# Patient Record
Sex: MALE | Race: BLACK OR AFRICAN AMERICAN | NOT HISPANIC OR LATINO | Employment: FULL TIME | URBAN - METROPOLITAN AREA
[De-identification: names, ages, dates, MRNs, and addresses within clinical notes are randomized per-mention and may not be internally consistent; named-entity substitution may affect disease eponyms.]

---

## 2017-10-03 ENCOUNTER — TRANSCRIBE ORDERS (OUTPATIENT)
Dept: ADMINISTRATIVE | Facility: HOSPITAL | Age: 52
End: 2017-10-03

## 2017-10-03 ENCOUNTER — HOSPITAL ENCOUNTER (OUTPATIENT)
Dept: CARDIOLOGY | Facility: HOSPITAL | Age: 52
Discharge: HOME | End: 2017-10-03
Attending: ORTHOPAEDIC SURGERY | Admitting: ORTHOPAEDIC SURGERY

## 2017-10-03 ENCOUNTER — LAB (OUTPATIENT)
Dept: LAB | Facility: HOSPITAL | Age: 52
End: 2017-10-03
Attending: ORTHOPAEDIC SURGERY

## 2017-10-03 DIAGNOSIS — Z01.811 PRE-OP CHEST EXAM: ICD-10-CM

## 2017-10-03 DIAGNOSIS — Z01.818 PRE-OP EXAM: Primary | ICD-10-CM

## 2017-10-03 DIAGNOSIS — Z01.811 PRE-OP CHEST EXAM: Primary | ICD-10-CM

## 2017-10-03 DIAGNOSIS — Z01.818 PRE-OP EXAM: ICD-10-CM

## 2017-10-03 LAB
ALBUMIN SERPL-MCNC: 4.4 G/DL (ref 3.5–5.2)
ALBUMIN/GLOB SERPL: 1.3 G/DL
ALP SERPL-CCNC: 79 U/L (ref 39–117)
ALT SERPL W P-5'-P-CCNC: 18 U/L (ref 1–41)
ANION GAP SERPL CALCULATED.3IONS-SCNC: 12.6 MMOL/L
AST SERPL-CCNC: 18 U/L (ref 1–40)
BASOPHILS # BLD AUTO: 0.02 10*3/MM3 (ref 0–0.2)
BASOPHILS NFR BLD AUTO: 0.3 % (ref 0–1.5)
BILIRUB SERPL-MCNC: 0.4 MG/DL (ref 0.1–1.2)
BUN BLD-MCNC: 15 MG/DL (ref 6–20)
BUN/CREAT SERPL: 14.7 (ref 7–25)
CALCIUM SPEC-SCNC: 9.6 MG/DL (ref 8.6–10.5)
CHLORIDE SERPL-SCNC: 102 MMOL/L (ref 98–107)
CO2 SERPL-SCNC: 25.4 MMOL/L (ref 22–29)
CREAT BLD-MCNC: 1.02 MG/DL (ref 0.76–1.27)
DEPRECATED RDW RBC AUTO: 44.2 FL (ref 37–54)
EOSINOPHIL # BLD AUTO: 0.27 10*3/MM3 (ref 0–0.7)
EOSINOPHIL NFR BLD AUTO: 3.8 % (ref 0.3–6.2)
ERYTHROCYTE [DISTWIDTH] IN BLOOD BY AUTOMATED COUNT: 12.9 % (ref 11.5–14.5)
GFR SERPL CREATININE-BSD FRML MDRD: 93 ML/MIN/1.73
GLOBULIN UR ELPH-MCNC: 3.3 GM/DL
GLUCOSE BLD-MCNC: 84 MG/DL (ref 65–99)
HCT VFR BLD AUTO: 40.2 % (ref 40.4–52.2)
HGB BLD-MCNC: 13.2 G/DL (ref 13.7–17.6)
IMM GRANULOCYTES # BLD: 0.02 10*3/MM3 (ref 0–0.03)
IMM GRANULOCYTES NFR BLD: 0.3 % (ref 0–0.5)
LYMPHOCYTES # BLD AUTO: 1.84 10*3/MM3 (ref 0.9–4.8)
LYMPHOCYTES NFR BLD AUTO: 26.1 % (ref 19.6–45.3)
MCH RBC QN AUTO: 30.6 PG (ref 27–32.7)
MCHC RBC AUTO-ENTMCNC: 32.8 G/DL (ref 32.6–36.4)
MCV RBC AUTO: 93.3 FL (ref 79.8–96.2)
MONOCYTES # BLD AUTO: 0.57 10*3/MM3 (ref 0.2–1.2)
MONOCYTES NFR BLD AUTO: 8.1 % (ref 5–12)
NEUTROPHILS # BLD AUTO: 4.34 10*3/MM3 (ref 1.9–8.1)
NEUTROPHILS NFR BLD AUTO: 61.4 % (ref 42.7–76)
PLATELET # BLD AUTO: 223 10*3/MM3 (ref 140–500)
PMV BLD AUTO: 11.2 FL (ref 6–12)
POTASSIUM BLD-SCNC: 4.3 MMOL/L (ref 3.5–5.2)
PROT SERPL-MCNC: 7.7 G/DL (ref 6–8.5)
RBC # BLD AUTO: 4.31 10*6/MM3 (ref 4.6–6)
SODIUM BLD-SCNC: 140 MMOL/L (ref 136–145)
WBC NRBC COR # BLD: 7.06 10*3/MM3 (ref 4.5–10.7)

## 2017-10-03 PROCEDURE — 80053 COMPREHEN METABOLIC PANEL: CPT

## 2017-10-03 PROCEDURE — 93005 ELECTROCARDIOGRAM TRACING: CPT | Performed by: ORTHOPAEDIC SURGERY

## 2017-10-03 PROCEDURE — 36415 COLL VENOUS BLD VENIPUNCTURE: CPT

## 2017-10-03 PROCEDURE — 85025 COMPLETE CBC W/AUTO DIFF WBC: CPT

## 2017-10-03 PROCEDURE — 93010 ELECTROCARDIOGRAM REPORT: CPT | Performed by: INTERNAL MEDICINE

## 2019-05-24 ENCOUNTER — HOSPITAL ENCOUNTER (OUTPATIENT)
Dept: ORTHOPEDIC SURGERY | Facility: CLINIC | Age: 54
Setting detail: RECURRING SERIES
Discharge: HOME | End: 2019-08-20
Attending: ORTHOPAEDIC SURGERY

## 2019-07-20 ENCOUNTER — HOSPITAL ENCOUNTER (OUTPATIENT)
Dept: URGENT CARE | Facility: CLINIC | Age: 54
Discharge: HOME | End: 2019-07-20

## 2019-11-06 ENCOUNTER — HOSPITAL ENCOUNTER (OUTPATIENT)
Dept: ORTHOPEDIC SURGERY | Facility: CLINIC | Age: 54
Setting detail: RECURRING SERIES
Discharge: HOME | End: 2019-12-04
Attending: ORTHOPAEDIC SURGERY

## 2021-02-08 ENCOUNTER — HOSPITAL ENCOUNTER (OUTPATIENT)
Dept: URGENT CARE | Facility: CLINIC | Age: 56
Discharge: HOME | End: 2021-02-08
Attending: NURSE PRACTITIONER

## 2021-03-01 ENCOUNTER — HOSPITAL ENCOUNTER (OUTPATIENT)
Dept: PREADMISSION TESTING | Facility: HOSPITAL | Age: 56
Discharge: HOME | End: 2021-03-01
Attending: SPECIALIST

## 2021-03-01 ENCOUNTER — HOSPITAL ENCOUNTER (OUTPATIENT)
Dept: OTHER | Facility: HOSPITAL | Age: 56
Discharge: HOME | End: 2021-03-01
Attending: SPECIALIST

## 2021-03-01 LAB
ANION GAP SERPL CALC-SCNC: 12 MMOL/L (ref 8–19)
BASOPHILS # BLD AUTO: 0.03 10*3/UL (ref 0–0.2)
BASOPHILS NFR BLD AUTO: 0.5 % (ref 0–3)
BUN SERPL-MCNC: 14 MG/DL (ref 5–25)
BUN/CREAT SERPL: 15 {RATIO} (ref 6–20)
CALCIUM SERPL-MCNC: 9 MG/DL (ref 8.7–10.4)
CHLORIDE SERPL-SCNC: 104 MMOL/L (ref 99–111)
CONV ABS IMM GRAN: 0.03 10*3/UL (ref 0–0.2)
CONV CO2: 25 MMOL/L (ref 22–32)
CONV IMMATURE GRAN: 0.5 % (ref 0–1.8)
CREAT UR-MCNC: 0.94 MG/DL (ref 0.7–1.2)
DEPRECATED RDW RBC AUTO: 49.6 FL (ref 35.1–43.9)
EOSINOPHIL # BLD AUTO: 0.18 10*3/UL (ref 0–0.7)
EOSINOPHIL # BLD AUTO: 2.8 % (ref 0–7)
ERYTHROCYTE [DISTWIDTH] IN BLOOD BY AUTOMATED COUNT: 15.2 % (ref 11.6–14.4)
GFR SERPLBLD BASED ON 1.73 SQ M-ARVRAT: >60 ML/MIN/{1.73_M2}
GLUCOSE SERPL-MCNC: 86 MG/DL (ref 70–99)
HCT VFR BLD AUTO: 40.8 % (ref 42–52)
HGB BLD-MCNC: 12.4 G/DL (ref 14–18)
LYMPHOCYTES # BLD AUTO: 1.18 10*3/UL (ref 1–5)
LYMPHOCYTES NFR BLD AUTO: 18.6 % (ref 20–45)
MCH RBC QN AUTO: 26.9 PG (ref 27–31)
MCHC RBC AUTO-ENTMCNC: 30.4 G/DL (ref 33–37)
MCV RBC AUTO: 88.5 FL (ref 80–96)
MONOCYTES # BLD AUTO: 0.6 10*3/UL (ref 0.2–1.2)
MONOCYTES NFR BLD AUTO: 9.4 % (ref 3–10)
NEUTROPHILS # BLD AUTO: 4.33 10*3/UL (ref 2–8)
NEUTROPHILS NFR BLD AUTO: 68.2 % (ref 30–85)
NRBC CBCN: 0 % (ref 0–0.7)
OSMOLALITY SERPL CALC.SUM OF ELEC: 284 MOSM/KG (ref 273–304)
PLATELET # BLD AUTO: 225 10*3/UL (ref 130–400)
PMV BLD AUTO: 11.1 FL (ref 9.4–12.4)
POTASSIUM SERPL-SCNC: 4.3 MMOL/L (ref 3.5–5.3)
RBC # BLD AUTO: 4.61 10*6/UL (ref 4.7–6.1)
SODIUM SERPL-SCNC: 137 MMOL/L (ref 135–147)
WBC # BLD AUTO: 6.35 10*3/UL (ref 4.8–10.8)

## 2021-03-02 LAB — SARS-COV-2 RNA SPEC QL NAA+PROBE: NOT DETECTED

## 2021-03-04 ENCOUNTER — OFFICE VISIT CONVERTED (OUTPATIENT)
Dept: SURGERY | Facility: CLINIC | Age: 56
End: 2021-03-04
Attending: SURGERY

## 2021-03-05 ENCOUNTER — HOSPITAL ENCOUNTER (OUTPATIENT)
Dept: CARDIOLOGY | Facility: HOSPITAL | Age: 56
Discharge: HOME | End: 2021-03-06
Attending: SPECIALIST

## 2021-03-05 LAB
ALBUMIN SERPL-MCNC: 4 G/DL (ref 3.5–5)
ALBUMIN/GLOB SERPL: 1.6 {RATIO} (ref 1.4–2.6)
ALP SERPL-CCNC: 92 U/L (ref 56–119)
ALT SERPL-CCNC: 20 U/L (ref 10–40)
ANION GAP SERPL CALC-SCNC: 9 MMOL/L (ref 8–19)
AST SERPL-CCNC: 16 U/L (ref 15–50)
BASOPHILS # BLD AUTO: 0.04 10*3/UL (ref 0–0.2)
BASOPHILS NFR BLD AUTO: 0.6 % (ref 0–3)
BILIRUB SERPL-MCNC: 0.32 MG/DL (ref 0.2–1.3)
BUN SERPL-MCNC: 15 MG/DL (ref 5–25)
BUN/CREAT SERPL: 15 {RATIO} (ref 6–20)
CALCIUM SERPL-MCNC: 8.8 MG/DL (ref 8.7–10.4)
CHLORIDE SERPL-SCNC: 106 MMOL/L (ref 99–111)
CONV ABS IMM GRAN: 0.01 10*3/UL (ref 0–0.2)
CONV CO2: 26 MMOL/L (ref 22–32)
CONV IMMATURE GRAN: 0.2 % (ref 0–1.8)
CONV TOTAL PROTEIN: 6.5 G/DL (ref 6.3–8.2)
CREAT UR-MCNC: 1.01 MG/DL (ref 0.7–1.2)
DEPRECATED RDW RBC AUTO: 48.3 FL (ref 35.1–43.9)
EOSINOPHIL # BLD AUTO: 0.21 10*3/UL (ref 0–0.7)
EOSINOPHIL # BLD AUTO: 3.2 % (ref 0–7)
ERYTHROCYTE [DISTWIDTH] IN BLOOD BY AUTOMATED COUNT: 15.3 % (ref 11.6–14.4)
GFR SERPLBLD BASED ON 1.73 SQ M-ARVRAT: >60 ML/MIN/{1.73_M2}
GLOBULIN UR ELPH-MCNC: 2.5 G/DL (ref 2–3.5)
GLUCOSE SERPL-MCNC: 96 MG/DL (ref 70–99)
HCT VFR BLD AUTO: 38.1 % (ref 42–52)
HGB BLD-MCNC: 11.7 G/DL (ref 14–18)
LYMPHOCYTES # BLD AUTO: 1.19 10*3/UL (ref 1–5)
LYMPHOCYTES NFR BLD AUTO: 18.2 % (ref 20–45)
MCH RBC QN AUTO: 26.5 PG (ref 27–31)
MCHC RBC AUTO-ENTMCNC: 30.7 G/DL (ref 33–37)
MCV RBC AUTO: 86.4 FL (ref 80–96)
MONOCYTES # BLD AUTO: 0.6 10*3/UL (ref 0.2–1.2)
MONOCYTES NFR BLD AUTO: 9.2 % (ref 3–10)
NEUTROPHILS # BLD AUTO: 4.48 10*3/UL (ref 2–8)
NEUTROPHILS NFR BLD AUTO: 68.6 % (ref 30–85)
NRBC CBCN: 0 % (ref 0–0.7)
OSMOLALITY SERPL CALC.SUM OF ELEC: 285 MOSM/KG (ref 273–304)
PLATELET # BLD AUTO: 196 10*3/UL (ref 130–400)
PMV BLD AUTO: 11.4 FL (ref 9.4–12.4)
POTASSIUM SERPL-SCNC: 4.4 MMOL/L (ref 3.5–5.3)
RBC # BLD AUTO: 4.41 10*6/UL (ref 4.7–6.1)
SODIUM SERPL-SCNC: 137 MMOL/L (ref 135–147)
WBC # BLD AUTO: 6.53 10*3/UL (ref 4.8–10.8)

## 2021-03-21 ENCOUNTER — HOSPITAL ENCOUNTER (OUTPATIENT)
Dept: URGENT CARE | Facility: CLINIC | Age: 56
Discharge: HOME | End: 2021-03-21
Attending: FAMILY MEDICINE

## 2021-05-10 NOTE — H&P
History and Physical      Patient Name: Abelino Canales   Patient ID: [id number]   Sex: Male   YOB: 1965    Referring Provider: Rudolph Allison MD    Visit Date: March 4, 2021    Provider: Paulino Nunez MD   Location: [facility]   Location Address: [address] [place] [address]  [id number]   Location Phone: [phone]          Chief Complaint  · Outpatient History & Physical / Surgical Orders  · Gallbladder Consult      History Of Present Illness  Abelino Canales is a 55 year old /Black male who presents to the office today as a consult from Rudolph Allison MD.      History of A. fib on Eliquis.  History of open umbilical hernia repair.    2 weeks ago he developed some sharp periumbilical 7 out of 10 pain after eating Chick-arabella-A.  History of the same after eating spicy foods.  No nausea vomiting.  No fever.  No blood in his stool.  No diarrhea.  No recent chest pain.  No tobacco use.  No family history of colorectal cancer.  Colonoscopy 2016 normal.    CT abdomen and pelvis 2/9/2021 at 0250 without contrast with twisted appearance of right paracentral mesenteric fat vasculature involving the ascending colon may represent a volvulus no pneumatosis.    CT abdomen and pelvis 2/9/2021 0400 with contrast redundant sigmoid colon without bowel obstruction or inflammation, cholelithiasis without evidence of cholecystitis    His pain had resolved by the time he received the second CAT scan.  No pain since that time.    Labs 2/8/2021 with WBC 8, neutrophils 76, hemoglobin 11       Past Medical History  Disease Name Date Onset Notes   Arthritis --  --    Limb Pain --  --    Limb Swelling --  --          Past Surgical History  Procedure Name Date Notes   Colonoscopy --  [place]-2015-colon polyps   Hernia --  --          Medication List  Name Date Started Instructions   Azopt 1 % ophthalmic (eye) drops,suspension  instill 1 drop into affected eye(s) by ophthalmic route 3  "times per day   Combigan 0.2-0.5 % ophthalmic (eye) drops  instill 1 drop into right eye by ophthalmic route every 12 hours   flecainide 100 mg oral tablet  take 1 tablet (100 mg) by oral route every 12 hours   Travatan Z 0.004 % ophthalmic (eye) drops  instill 1 drop into affected eye(s) by ophthalmic route once daily in the evening   Xarelto 20 mg oral tablet  take 1 tablet (20 mg) by oral route once daily with the evening meal         Allergy List  Allergen Name Date Reaction Notes   NO KNOWN DRUG ALLERGIES --  --  --        Allergies Reconciled  Family Medical History  Disease Name Relative/Age Notes   Diabetes, unspecified type Father/   Father   No family history of colorectal cancer  --          Social History  Finding Status Start/Stop Quantity Notes   Tobacco Never --/-- --  --          Review of Systems  · Constitutional  o Denies  o : chills, fever  · Eyes  o Denies  o : yellowish discoloration of eyes  · HENT  o Denies  o : nose bleeding, difficulty swallowing  · Cardiovascular  o Denies  o : chest pain on exertion  · Respiratory  o Denies  o : shortness of breath, cough  · Gastrointestinal  o Denies  o : nausea, vomiting  · Genitourinary  o Denies  o : abnormal color of urine, frequency of urine  · Integument  o Denies  o : rash, skin lesion or lump  · Neurologic  o Denies  o : tingling or numbness  · Musculoskeletal  o Denies  o : joint pain  · Endocrine  o Denies  o : weight gain, weight loss  · Heme-Lymph  o Denies  o : lightheadedness, petechiae      Vitals  Date Time BP Position Site L\R Cuff Size HR RR TEMP (F) WT  HT  BMI kg/m2 BSA m2 O2 Sat FR L/min FiO2        03/04/2021 03:29 PM       16  237lbs 2oz 6'  4\" 28.86 2.4             Physical Examination  · Constitutional  o Appearance  o : healthy appearing, alert and in no acute distress, reliable historian  · Head and Face  o Head  o :   § Inspection  § : no visable deformities or lesions  · Eyes  o Conjunctivae  o : clear  o Sclerae  o : " clear  · Neck  o Inspection/Palpation  o : normal appearance, no masses, trachea midline  · Respiratory  o Respiratory Effort  o : breathing unlabored, respiratory effort appears normal  o Inspection of Chest  o : normal appearance, no retractions  · Cardiovascular  o Heart  o : regular rate and rhythm  · Gastrointestinal  o Abdominal Examination  o :   § Abdomen  § : soft, nontender, nondistended, no hepatosplenomegaly, bowel sounds present, no hernia, no mass  · Skin and Subcutaneous Tissue  o General Inspection  o : no visible concerning rashes or lesions present  · Neurologic  o Cranial Nerves  o : no obvious motor deficits  o Sensation  o : no obvious sensory deficits  o Gait and Station  o :   § Gait Screening  § : normal gait, able to stand without diffculty  o Cerebellar Function  o : no obvious abnormalities  · Psychiatric  o Judgement and Insight  o : judgment and insight intact  o Mood and Affect  o : mood normal, affect appropriate          Results     See HPI       Assessment  · Abdominal Pain, Generalized     789.07/R10.84  · Preop testing     V72.84/Z01.818      Plan  · Orders  o [facility] Pre-Op Covid-19 Screening (72499) - - 03/04/2021  · Medications  o Medications have been Reconciled  o Transition of Care or Provider Policy  · Instructions  o Electronically Identified Patient Education Materials Provided Electronically  · Disposition  o Call or Return if symptoms worsen or persist.     I reviewed the patient's CT images personally.  I had a discussion with the patient.  I explained to him he may have some type of malrotation and explained what that was.  Have a mobile cecum.  With his multiple medical comorbidities as well as A. fib with Eliquis and cardioversion planned in the near future, I do not recommend surgery at this time.  He is scheduled for colonoscopy in the near future.  I told him to notify the GI of this episode.  If he develops recurrent pain secondary to concern for volvulus and I  would recommend laparoscopic right colectomy.  Follow-up with me as needed.  All questions answered.  He agrees with the plan.  Thank you for this consult.             Electronically Signed by: Paulino Nunez MD -Author on March 9, 2021 11:51:01 AM

## 2021-05-11 ENCOUNTER — OFFICE VISIT CONVERTED (OUTPATIENT)
Dept: GASTROENTEROLOGY | Facility: CLINIC | Age: 56
End: 2021-05-11
Attending: NURSE PRACTITIONER

## 2021-05-14 VITALS — HEIGHT: 76 IN | RESPIRATION RATE: 16 BRPM | WEIGHT: 237.12 LBS | BODY MASS INDEX: 28.88 KG/M2

## 2021-06-05 NOTE — H&P
History and Physical      Patient Name: Abelino Canales   Patient ID: [id number]   Sex: Male   YOB: 1965    Primary Care Provider: Rudolph Allison MD   Referring Provider: Rusty Bryant    Visit Date: May 11, 2021    Provider: BRENDA Centeno   Location: [facility]   Location Address: [address] [place] [address]  [id number]   Location Phone: [phone]          Chief Complaint  · ER F/U      History Of Present Illness  Abelino Canales is a 55 year old /Black male who presents to the office today.      New pt presents in f/u from ER visit in Feb. Pt states he had acute abd pain while at work and went to ER. Pt states acute pain resolved while in ER. He does occasionally have random abd pains, he points to left and right sides. Usually no pain w meals. Occasionally has constipation. NO blood in stool or black stool. No HB.    Hx colonoscopy 2015 by Dr Spear, 1 small adenomatous polyp.    ER: CT of the abdomen and pelvis 2/9/21 without contrast showed abnormal appearance of the ascending colon and cecum with narrowing suggestive of a volvulus or internal hernia, gallstones without cholecystitis; repeat CT w contrast did not show anything acute, possible incomplete malrotation of bowel. Pt has seen Dr Nunez and no surgery recommended at this time.   ER 2/9/21 hemoglobin was low at 11.5, normal white count, CMP was unremarkable. Repeat Hgb 11.7 3/5/21    Pt has hx of afib, on Xarelto, Dr Paez, has had cardioversion and states has been in normal rhythm.       Past Medical History  Arthritis; Limb Pain; Limb Swelling         Past Surgical History  Colonoscopy; Hernia         Medication List  Name Date Started Instructions   Combigan 0.2-0.5 % ophthalmic (eye) drops  instill 1 drop into right eye by ophthalmic route every 12 hours   flecainide 100 mg oral tablet  take 1 tablet (100 mg) by oral route every 12 hours   ibuprofen 200 mg oral capsule   "take 1 capsule by oral route As needed   Travatan Z 0.004 % ophthalmic (eye) drops  instill 1 drop into affected eye(s) by ophthalmic route once daily in the evening   Xarelto 20 mg oral tablet  take 1 tablet (20 mg) by oral route once daily with the evening meal         Allergy List  NO KNOWN DRUG ALLERGIES       Allergies Reconciled  Family Medical History  Diabetes, unspecified type; No family history of colorectal cancer         Social History  Tobacco (Never)         Review of Systems  · Constitutional  o Admits  o : good general health lately, no acute distress  · Eyes  o Admits  o : glaucoma  o Denies  o : cataracts  · HENT  o Denies  o : sore throat, hearing problems  · Cardiovascular  o Admits  o : irregular heartbeat  o Denies  o : heart failure  · Respiratory  o Denies  o : asthma, shortness of breath  · Gastrointestinal  o Denies  o : additional gastrointestinal symptoms except as noted in the HPI  · Genitourinary  o Denies  o : dysuria, kidney stone  · Neurologic  o Denies  o : strokes, seizure activity  · Musculoskeletal  o Admits  o : arthritis, bone or joint pain  · Psychiatric  o Admits  o : pleasant affect  o Denies  o : depression  · Heme-Lymph  o Denies  o : bleeding disorder  · Allergic-Immunologic  o Denies  o : seasonal allergies      Vitals  Date Time BP Position Site L\R Cuff Size HR RR TEMP (F) WT  HT  BMI kg/m2 BSA m2 O2 Sat FR L/min FiO2        05/11/2021 10:41 /70 Sitting    71 - R  98.4 236lbs 0oz 6'  4\" 28.73 2.4             Physical Examination  · Constitutional  o Appearance  o : well developed, well-nourished, in no acute distress  · Head and Face  o Head  o :   § Inspection  § : atraumatic, normocephalic  · Eyes  o Sclerae  o : sclerae white, no sclerae icterus  · Neck  o Inspection/Palpation  o : supple  · Respiratory  o Respiratory Effort  o : breathing unlabored  o Inspection of Chest  o : normal appearance, no retractions  · Cardiovascular  o Peripheral Vascular " System  o :   § Extremities  § : no cyanosis, clubbing or edema  · Gastrointestinal  o Abdominal Examination  o : soft, nontender to palpation  · Skin and Subcutaneous Tissue  o General Inspection  o : no lesions present, no rashes present  · Neurologic  o Mental Status Examination  o :   § Orientation  § : grossly oriented to person, place and time  § Speech/Language  § : communication ability within normal limits, voice quality normal, articulation of speech normal, no evidence of aphasia  § Attention  § : attention normal, concentration abilities normal  o Sensation  o : grossly intact  o Gait and Station  o :   § Gait Screening  § : normal gait  · Psychiatric  o General  o : Alert and oriented x3  o Mood and Affect  o : Mood and affect are appropriate to circumstances              Assessment  · Pre-op exam     V72.84/Z01.818  · Abnormal finding on GI tract imaging     793.4/R93.3  possible incomplete malrotation of bowel  · Anemia     285.9/D64.9  · Constipation     564.00/K59.00  · History of colon polyps     V12.72/Z86.010  · Gallstone     574.20/K80.20  asymptomatic      Plan  · Medications  o TriLyte With Flavor Packets 420 gram oral recon soln   SIG: take as directed   DISP: (1) Box with 0 refills  Prescribed on 05/11/2021     o Medications have been Reconciled  o Transition of Care or Provider Policy  · Instructions  o Please Sign Permit for: Colonoscopy  o Indication: hx possible incomplete maltrotation of bowel, hx colon polyps, anemia  o Surgical Risk and Benefits: Possible risks/complications, benefits, and alternatives to surgical or invasive procedure have been explained to patient and/or legal guardian; Patient has been evaluated and can tolerate anesthesia and/or sedation. Risks, benefits, and alternatives to anesthesia and sedation have been explained to patient and/or legal guardian.  o Follow Up after Procedure.  o Other Instructions: Dr Philippe velasquez/xarelto  o Encouraged to follow-up with  Primary Care Provider for preventative care.  o Patient was educated/instructed on their diagnosis, treatment and medications prior to discharge from the clinic today.  o Patient instructed to seek medical attention urgently for new or worsening symptoms.            Electronically Signed by: BRENDA Centeno - on May 11, 2021 11:20:17 AM

## 2021-06-18 RX ORDER — HYDROCODONE BITARTRATE AND ACETAMINOPHEN 5; 325 MG/1; MG/1
1 TABLET ORAL EVERY 8 HOURS PRN
COMMUNITY
End: 2023-03-13

## 2021-06-18 RX ORDER — FLECAINIDE ACETATE 100 MG/1
100 TABLET ORAL 2 TIMES DAILY
COMMUNITY

## 2021-06-24 ENCOUNTER — ANESTHESIA (OUTPATIENT)
Dept: GASTROENTEROLOGY | Facility: HOSPITAL | Age: 56
End: 2021-06-24

## 2021-06-24 ENCOUNTER — ANESTHESIA EVENT (OUTPATIENT)
Dept: GASTROENTEROLOGY | Facility: HOSPITAL | Age: 56
End: 2021-06-24

## 2021-06-24 ENCOUNTER — HOSPITAL ENCOUNTER (OUTPATIENT)
Facility: HOSPITAL | Age: 56
Setting detail: HOSPITAL OUTPATIENT SURGERY
Discharge: HOME | End: 2021-06-24
Attending: INTERNAL MEDICINE | Admitting: INTERNAL MEDICINE

## 2021-06-24 VITALS
HEART RATE: 67 BPM | OXYGEN SATURATION: 99 % | DIASTOLIC BLOOD PRESSURE: 81 MMHG | TEMPERATURE: 97.5 F | HEIGHT: 76 IN | BODY MASS INDEX: 28.83 KG/M2 | WEIGHT: 236.77 LBS | RESPIRATION RATE: 15 BRPM | SYSTOLIC BLOOD PRESSURE: 173 MMHG

## 2021-06-24 PROCEDURE — 45378 DIAGNOSTIC COLONOSCOPY: CPT | Performed by: INTERNAL MEDICINE

## 2021-06-24 PROCEDURE — 25010000002 PROPOFOL 10 MG/ML EMULSION: Performed by: ANESTHESIOLOGY

## 2021-06-24 RX ORDER — SODIUM CHLORIDE, SODIUM LACTATE, POTASSIUM CHLORIDE, CALCIUM CHLORIDE 600; 310; 30; 20 MG/100ML; MG/100ML; MG/100ML; MG/100ML
30 INJECTION, SOLUTION INTRAVENOUS CONTINUOUS
Status: DISCONTINUED | OUTPATIENT
Start: 2021-06-24 | End: 2021-06-24 | Stop reason: HOSPADM

## 2021-06-24 RX ORDER — SODIUM CHLORIDE 0.9 % (FLUSH) 0.9 %
3 SYRINGE (ML) INJECTION EVERY 12 HOURS SCHEDULED
Status: DISCONTINUED | OUTPATIENT
Start: 2021-06-24 | End: 2021-06-24 | Stop reason: HOSPADM

## 2021-06-24 RX ORDER — LIDOCAINE HYDROCHLORIDE 20 MG/ML
INJECTION, SOLUTION INFILTRATION; PERINEURAL AS NEEDED
Status: DISCONTINUED | OUTPATIENT
Start: 2021-06-24 | End: 2021-06-24 | Stop reason: SURG

## 2021-06-24 RX ORDER — PROPOFOL 10 MG/ML
VIAL (ML) INTRAVENOUS AS NEEDED
Status: DISCONTINUED | OUTPATIENT
Start: 2021-06-24 | End: 2021-06-24 | Stop reason: SURG

## 2021-06-24 RX ORDER — SODIUM CHLORIDE 0.9 % (FLUSH) 0.9 %
10 SYRINGE (ML) INJECTION AS NEEDED
Status: DISCONTINUED | OUTPATIENT
Start: 2021-06-24 | End: 2021-06-24 | Stop reason: HOSPADM

## 2021-06-24 RX ADMIN — PROPOFOL 150 MG: 10 INJECTION, EMULSION INTRAVENOUS at 09:24

## 2021-06-24 RX ADMIN — PROPOFOL 200 MCG/KG/MIN: 10 INJECTION, EMULSION INTRAVENOUS at 09:24

## 2021-06-24 RX ADMIN — SODIUM CHLORIDE, POTASSIUM CHLORIDE, SODIUM LACTATE AND CALCIUM CHLORIDE: 600; 310; 30; 20 INJECTION, SOLUTION INTRAVENOUS at 09:19

## 2021-06-24 RX ADMIN — LIDOCAINE HYDROCHLORIDE 50 MG: 20 INJECTION, SOLUTION INFILTRATION; PERINEURAL at 09:24

## 2021-06-24 NOTE — H&P
Pre Procedure History & Physical    Chief Complaint:   Anemia, abnormal CT scan of the GI tract    Subjective     HPI:   Anemia, abnormal CT scan    Past Medical History:   Past Medical History:   Diagnosis Date   • Arthritis    • Atrial fibrillation (CMS/HCC) 2000    WENT INTO A-FIB FOLLOWING HERNIA SURGERY   • Decreased ROM of left shoulder     FROZEN LEFT SHOULDER   • Glaucoma    • Limb pain    • Limb swelling        Past Surgical History:  Past Surgical History:   Procedure Laterality Date   • ABDOMINAL SURGERY      UMBILICAL HERNIA REPAIRED X 2   • CARPAL TUNNEL RELEASE WITH CUBITAL TUNNEL RELEASE      FROM SHOULDER TO ULNA   • COLONOSCOPY      [place]-2015-colon polyps   • EYE SURGERY      GLAUCOMA SURGERY   • EYE SURGERY      RIGHT EYE CATARACT REMOVED   • HERNIA REPAIR     • SHOULDER ARTHROSCOPY         Family History:  Family History   Problem Relation Age of Onset   • Cancer Father    • Diabetes Father        Social History:   reports that he has never smoked. He does not have any smokeless tobacco history on file. He reports current alcohol use. He reports that he does not use drugs.    Medications:   Medications Prior to Admission   Medication Sig Dispense Refill Last Dose   • Brimonidine Tartrate-Timolol (COMBIGAN OP) Apply 1 drop to eye(s) as directed by provider 2 (two) times a day. LEFT EYE ONLY      • Brinzolamide (AZOPT OP) Apply 1 drop to eye(s) as directed by provider 3 (Three) Times a Day. LEFT EYE ONLY      • flecainide (TAMBOCOR) 100 MG tablet Take 100 mg by mouth 2 (Two) Times a Day.      • HYDROcodone-acetaminophen (NORCO) 5-325 MG per tablet Take 1 tablet by mouth Every 8 (Eight) Hours As Needed for Moderate Pain .      • Latanoprostene Bunod (VYZULTA OP) Apply 1 drop to eye(s) as directed by provider Every Night. LEFT EYE ONLY      • rivaroxaban (Xarelto) 20 MG tablet Take 20 mg by mouth Daily. LAST DOSE June 9TH DUE  TUBA TUNNEL RELEASE SURGERY ON 6/15 STAYING OFF TIL AFTER COLONOSCOPY     "      Allergies:  Patient has no known allergies.        Objective     Blood pressure 125/77, pulse 65, temperature 97.9 °F (36.6 °C), temperature source Temporal, resp. rate 18, height 193 cm (75.98\"), weight 107 kg (236 lb 12.4 oz), SpO2 97 %.    Physical Exam   Constitutional: Pt is oriented to person, place, and time and well-developed, well-nourished, and in no distress.   Mouth/Throat: Oropharynx is clear and moist.   Neck: Normal range of motion.   Cardiovascular: Normal rate, regular rhythm and normal heart sounds.    Pulmonary/Chest: Effort normal and breath sounds normal.   Abdominal: Soft. Nontender  Skin: Skin is warm and dry.   Psychiatric: Mood, memory, affect and judgment normal.     Assessment/Plan     Diagnosis:  EMEA, abnormal CT scan of the right colon    Anticipated Surgical Procedure:  Colonoscopy    The risks, benefits, and alternatives of this procedure have been discussed with the patient or the responsible party- the patient understands and agrees to proceed.            "

## 2021-06-24 NOTE — ANESTHESIA POSTPROCEDURE EVALUATION
Patient: Abelino Canales    Procedure Summary     Date: 06/24/21 Room / Location: [facility] ENDOSCOPY 4 / [facility]    Anesthesia Start: 0919 Anesthesia Stop: 0954    Procedure: COLONOSCOPY (N/A ) Diagnosis: (ANEMIA, HX COLON POLYPS)    Surgeons: Rudolph Spear MD Provider: Gilberto Tran MD    Anesthesia Type: general ASA Status: 3          Anesthesia Type: general    Vitals  Vitals Value Taken Time   /72 06/24/21 1011   Temp     Pulse 66 06/24/21 1015   Resp     SpO2 97 % 06/24/21 1015   Vitals shown include unvalidated device data.        Post Anesthesia Care and Evaluation    Patient location during evaluation: bedside  Patient participation: complete - patient participated  Level of consciousness: awake  Pain score: 0  Pain management: adequate  Airway patency: patent  Anesthetic complications: No anesthetic complications  PONV Status: none  Cardiovascular status: acceptable and stable  Respiratory status: acceptable and room air  Hydration status: acceptable

## 2021-06-24 NOTE — ADDENDUM NOTE
Addendum  created 06/24/21 1041 by Gilberto Tran MD    Attestation recorded in Intraprocedure, Intraprocedure Attestations filed

## 2021-06-24 NOTE — ANESTHESIA PREPROCEDURE EVALUATION
Anesthesia Evaluation     Patient summary reviewed and Nursing notes reviewed   no history of anesthetic complications:  NPO Solid Status: > 8 hours  NPO Liquid Status: > 2 hours           Airway   Mallampati: III  TM distance: >3 FB  Neck ROM: full  No difficulty expected  Dental      Pulmonary - negative pulmonary ROS and normal exam    breath sounds clear to auscultation  Cardiovascular - normal exam  Exercise tolerance: good (4-7 METS)    Rhythm: regular    (+) dysrhythmias Atrial Fib,       Neuro/Psych- negative ROS  GI/Hepatic/Renal/Endo - negative ROS     Musculoskeletal     Abdominal    Substance History - negative use     OB/GYN negative ob/gyn ROS         Other   arthritis,      ROS/Med Hx Other: SINUS RHYTHM  FIRST DEGREE AV BLOCK  NO SIGNIFICANT CHANGE FROM PREVIOUS ECG    Pt had one episode of afib after hernia, no problems since them                Anesthesia Plan    ASA 3     general   total IV anesthesia  intravenous induction     Anesthetic plan, all risks, benefits, and alternatives have been provided, discussed and informed consent has been obtained with: patient and child.

## 2021-06-30 ENCOUNTER — TRANSCRIBE ORDERS (OUTPATIENT)
Dept: PHYSICAL THERAPY | Facility: CLINIC | Age: 56
End: 2021-06-30

## 2021-06-30 ENCOUNTER — TREATMENT (OUTPATIENT)
Dept: PHYSICAL THERAPY | Facility: CLINIC | Age: 56
End: 2021-06-30

## 2021-06-30 DIAGNOSIS — G56.21 CUBITAL TUNNEL SYNDROME ON RIGHT: Primary | ICD-10-CM

## 2021-06-30 DIAGNOSIS — M25.521 RIGHT ELBOW PAIN: ICD-10-CM

## 2021-06-30 DIAGNOSIS — M25.621 STIFFNESS OF RIGHT ELBOW JOINT: ICD-10-CM

## 2021-06-30 PROCEDURE — 97110 THERAPEUTIC EXERCISES: CPT | Performed by: OCCUPATIONAL THERAPIST

## 2021-06-30 PROCEDURE — 97165 OT EVAL LOW COMPLEX 30 MIN: CPT | Performed by: OCCUPATIONAL THERAPIST

## 2021-06-30 PROCEDURE — 97022 WHIRLPOOL THERAPY: CPT | Performed by: OCCUPATIONAL THERAPIST

## 2021-06-30 NOTE — PROGRESS NOTES
Outpatient Occupational Therapy Ortho Initial Evaluation    Patient: Abelino Canales   : 1965  Diagnosis/ICD-10 Code:  Cubital tunnel syndrome on right [G56.21]  Referring practitioner: No ref. provider found  Date of Initial Visit: 2021  Today's Date: 2021  Patient seen for 1 sessions               Subjective Questionnaire: QuickDASH: 39      Subjective Evaluation    History of Present Illness  Date of surgery: 6/15/2021  Mechanism of injury: R elbow pain since 2016, new job re-aggravated from repetitive movements. 6/15/21 ulnar nerve transposition.  Order for OT for A/PROM, scar care, pain mgmt      Patient Occupation: works at [employer] and Work Restrictions: one handed dutyQuality of life: excellent    Pain  Current pain rating: 3  At best pain ratin  Quality: sharp  Aggravating factors: repetitive movement and overhead activity    Social Support  Lives in: one-story house  Lives with: spouse    Hand dominance: right    Treatments  Treatments tried: occupational therapy.  Patient Goals  Patient goals for therapy: decreased edema, decreased pain, increased motion, increased strength, independence with ADLs/IADLs, return to sport/leisure activities and return to work           Past Medical hx:    Objective          Neurological Testing     Sensation     Elbow     Right Elbow   Intact: light touch    Comments   Right light touch: SW 2.83 along ulnar nerve.     Active Range of Motion     Right Elbow   Flexion: 130 degrees   Extension: 30 degrees   Forearm supination: 60 degrees   Forearm pronation: 80 degrees     Strength/Myotome Testing     Additional Strength Details  L 30#  R 10#    Swelling     Right Elbow Girth Measurements   Girth at joint line (cm): 31.5 cm.      Compression tubi  sleeve issued for support of edema at elbow    Assessment & Plan     Assessment  Impairments: abnormal coordination, abnormal or restricted ROM, activity intolerance, impaired physical strength,  lacks appropriate home exercise program and pain with function  Prognosis: good  Functional Limitations: carrying objects, lifting, pulling, pushing, reaching overhead and unable to perform repetitive tasks  Goals  Plan Goals: 1. The patient complains of pain in the R elbow                  LTG 1: 12 weeks:  The patient will report a pain rating of 0/10 or better in order to improve sleep quality and tolerance to performance of activities of daily living.                                  STATUS:  New                  STG 1a: 4weeks:  The patient will report a pain rating of 6/10 or better.                                   STATUS:  New  2. The patient has limited ROM of the R elbow                  LTG 2: 12 weeks:  The patient will demonstrate  0-140 degrees of CASTELLON to allow the patient to Reach and WB.                                  STATUS:  New                   STG 2a: 4 weeks:  The patient will demonstrate  degrees of CASTELLON.                                  STATUS:  New                             3. The patient has limited strength of the R UE.                  LTG 3: 12 weeks:  The patient will demonstrate 45# in order to Return to work unrestricted.                                  STATUS:  New                  STG 3a: 4 weeks:  The patient will demonstrate tolerance to light strengthening without adverse reaction.                                  STATUS:  New  4. Carrying, Moving, and Handling Objects Functional Limitation                                   LTG 4: 12 weeks:  The patient will demonstrate 1-19% limitation by achieving a score of 15 on the Quick DASH.                                  STATUS:  New                  STG 4a: 4 weeks:  The patient will demonstrate 40-59% limitation by achieving a score of 30 on the Quick DASH.                                    STATUS:  New                    TREATMENT: Orthotic fabrication/fitting/management and training, Manual therapy, therapeutic exercise,  home exercise instruction, and modalities as needed to include: electrical stimulation, ultrasound, moist heat, paraffin, fluidotherapy and ice.      Plan  Planned modality interventions: thermotherapy (paraffin bath), thermotherapy (hydrocollator packs) and TENS  Other planned modality interventions: fluidotherapy  Planned therapy interventions: manual therapy, neuromuscular re-education, soft tissue mobilization, strengthening, stretching, therapeutic activities, joint mobilization, home exercise program, functional ROM exercises, flexibility, fine motor coordination training and ADL retraining  Frequency: 2x week  Duration in weeks: 12  Treatment plan discussed with: patient        Patient is indicated for skilled occupational therapy services.    History # of Personal Factors and/or Comorbidities: LOW (0)  Examination of Body System(s): # of elements: LOW (1-2)  Clinical Presentation: STABLE   Clinical Decision Making: LOW      Evaluation:  Low Complexity:    30     mins  28876;  Mod Complexity:    0     mins  96602;  High Complexity:    0     mins  62638;    Timed:  Manual Therapy:    5     mins  89188;  Therapeutic Exercise:    15     mins  96725;     Neuromuscular Davie:    0    mins  24656;    Therapeutic Activity:     0     mins  86873;     Ultrasound:     0     mins  58703;    Electrical Stimulation:    0     mins  85469 ( );    Untimed:  Electrical Stimulation:    0     mins  48804 ( );  Fluidotherapy:  __10_ mins  32834    Timed Treatment:   20   mins   Total Treatment:     60   mins      OT SIGNATURE: TAURUS Jarquin, OTR/L, CHT   DATE TREATMENT INITIATED: 6/30/2021    Initial Certification  Certification Period: 9/28/2021  I certify that the therapy services are furnished while this patient is under my care.  The services outlined above are required by this patient, and will be reviewed every 90 days.     PHYSICIAN:       DATE:     Please sign and return via fax to [phone]   Thank  you, [facility] Occupational Therapy.

## 2021-07-06 ENCOUNTER — TREATMENT (OUTPATIENT)
Dept: PHYSICAL THERAPY | Facility: CLINIC | Age: 56
End: 2021-07-06

## 2021-07-06 DIAGNOSIS — M25.521 RIGHT ELBOW PAIN: ICD-10-CM

## 2021-07-06 DIAGNOSIS — M25.621 STIFFNESS OF RIGHT ELBOW JOINT: ICD-10-CM

## 2021-07-06 DIAGNOSIS — G56.21 CUBITAL TUNNEL SYNDROME ON RIGHT: Primary | ICD-10-CM

## 2021-07-06 PROCEDURE — 97014 ELECTRIC STIMULATION THERAPY: CPT | Performed by: OCCUPATIONAL THERAPIST

## 2021-07-06 PROCEDURE — 97110 THERAPEUTIC EXERCISES: CPT | Performed by: OCCUPATIONAL THERAPIST

## 2021-07-06 NOTE — PROGRESS NOTES
Occupational Therapy Daily Treatment Note      Patient: Abelino Canales   : 1965  Referring practitioner: John Corral, *  Date of Initial Visit: Type: THERAPY  Noted: 2021  Today's Date: 2021  Patient seen for 2 sessions    ICD-10-CM ICD-9-CM   1. Cubital tunnel syndrome on right  G56.21 354.2   2. Stiffness of right elbow joint  M25.621 719.52   3. Right elbow pain  M25.521 719.42          Abelino Canales reports he is not having any pain today.        Objective   See Exercise, Manual, and Modality Logs for complete treatment.       Assessment/Plan  Pt tolerating  AROM better this date.  Having some shaking in ulnar intrinsic with functional hand movements/ tasks.      Cont per POC           Timed:  Manual Therapy:    0     mins  47016;  Therapeutic Exercise:    20     mins  31536;     Neuromuscular Davie:    0    mins  70166;    Ultrasound:     0     mins  00435;    Electrical Stimulation:    0     mins  63237 ( );    Untimed:  Electrical Stimulation:    10     mins  59575 ( );  Fluidotherapy:        0    mins  09398    Timed Treatment:   20   mins   Total Treatment:     30   mins  TAURUS Jarquin, OTR/L,CHT  Occupational Therapist, Certified Hand Therapist

## 2021-07-14 ENCOUNTER — TREATMENT (OUTPATIENT)
Dept: PHYSICAL THERAPY | Facility: CLINIC | Age: 56
End: 2021-07-14

## 2021-07-14 DIAGNOSIS — M25.521 RIGHT ELBOW PAIN: ICD-10-CM

## 2021-07-14 DIAGNOSIS — M25.621 STIFFNESS OF RIGHT ELBOW JOINT: ICD-10-CM

## 2021-07-14 DIAGNOSIS — G56.21 CUBITAL TUNNEL SYNDROME ON RIGHT: Primary | ICD-10-CM

## 2021-07-14 PROCEDURE — 97014 ELECTRIC STIMULATION THERAPY: CPT | Performed by: PHYSICAL THERAPIST

## 2021-07-14 PROCEDURE — 97110 THERAPEUTIC EXERCISES: CPT | Performed by: PHYSICAL THERAPIST

## 2021-07-14 NOTE — PROGRESS NOTES
Occupational Therapy Daily Treatment Note      Patient: Abelino Canales   : 1965  Referring practitioner: John Corral, *  Date of Initial Visit: Type: THERAPY  Noted: 2021  Today's Date: 2021  Patient seen for 3 sessions         Abelino Canales reports: it is sore but getting better.        Subjective Evaluation    Pain  Current pain ratin           Objective   See Exercise, Manual, and Modality Logs for complete treatment.       Assessment/Plan    Visit Diagnoses:    ICD-10-CM ICD-9-CM   1. Cubital tunnel syndrome on right  G56.21 354.2   2. Stiffness of right elbow joint  M25.621 719.52   3. Right elbow pain  M25.521 719.42       Continue per POC         Timed:  Manual Therapy:    0     mins  96603;  Therapeutic Exercise:    20     mins  58473;       Untimed:  Electrical Stimulation:    10     mins  60782 ( );  Fluidotherapy        0    mins  53519    Timed Treatment:   20   mins   Total Treatment:     30   min    Josseline Lui OTR/L  Occupational Therapist

## 2021-07-15 VITALS
HEART RATE: 71 BPM | WEIGHT: 236 LBS | DIASTOLIC BLOOD PRESSURE: 70 MMHG | BODY MASS INDEX: 28.74 KG/M2 | TEMPERATURE: 98.4 F | HEIGHT: 76 IN | SYSTOLIC BLOOD PRESSURE: 140 MMHG

## 2021-07-19 ENCOUNTER — TREATMENT (OUTPATIENT)
Dept: PHYSICAL THERAPY | Facility: CLINIC | Age: 56
End: 2021-07-19

## 2021-07-19 DIAGNOSIS — G56.21 CUBITAL TUNNEL SYNDROME ON RIGHT: Primary | ICD-10-CM

## 2021-07-19 DIAGNOSIS — M25.521 RIGHT ELBOW PAIN: ICD-10-CM

## 2021-07-19 DIAGNOSIS — M25.621 STIFFNESS OF RIGHT ELBOW JOINT: ICD-10-CM

## 2021-07-19 PROCEDURE — 97110 THERAPEUTIC EXERCISES: CPT | Performed by: OCCUPATIONAL THERAPIST

## 2021-07-19 PROCEDURE — 97014 ELECTRIC STIMULATION THERAPY: CPT | Performed by: OCCUPATIONAL THERAPIST

## 2021-07-19 NOTE — PROGRESS NOTES
Occupational Therapy Daily Treatment Note      Patient: Abelino Canales   : 1965  Referring practitioner: John Corral, *  Date of Initial Visit: Type: THERAPY  Noted: 2021  Today's Date: 2021  Patient seen for 4 sessions    ICD-10-CM ICD-9-CM   1. Cubital tunnel syndrome on right  G56.21 354.2   2. Stiffness of right elbow joint  M25.621 719.52   3. Right elbow pain  M25.521 719.42          Abelino Canales reports 3-4/10 pain still having some stiffness. When I signed my name, it began to hurt      Objective   See Exercise, Manual, and Modality Logs for complete treatment.       Assessment/Plan  Continues to have edema over incision.  And fatigues quickly with functional tasks, but progressing with AROM.       Cont per POC           Timed:  Manual Therapy:    0     mins  13861;  Therapeutic Exercise:    20     mins  38112;     Neuromuscular Davie:    0    mins  80529;    Ultrasound:     0     mins  47221;    Electrical Stimulation:    0     mins  12254 ( );    Untimed:  Electrical Stimulation:    10     mins  06646 ( );  Fluidotherapy:        0    mins  75513    Timed Treatment:   20   mins   Total Treatment:     30   mins  TAURUS Jarquin, OTR/L,CHT  Occupational Therapist, Certified Hand Therapist

## 2021-07-21 ENCOUNTER — TREATMENT (OUTPATIENT)
Dept: PHYSICAL THERAPY | Facility: CLINIC | Age: 56
End: 2021-07-21

## 2021-07-21 ENCOUNTER — TELEPHONE (OUTPATIENT)
Dept: GASTROENTEROLOGY | Facility: CLINIC | Age: 56
End: 2021-07-21

## 2021-07-21 DIAGNOSIS — M25.521 RIGHT ELBOW PAIN: ICD-10-CM

## 2021-07-21 DIAGNOSIS — G56.21 CUBITAL TUNNEL SYNDROME ON RIGHT: Primary | ICD-10-CM

## 2021-07-21 DIAGNOSIS — M25.621 STIFFNESS OF RIGHT ELBOW JOINT: ICD-10-CM

## 2021-07-21 PROCEDURE — 97110 THERAPEUTIC EXERCISES: CPT | Performed by: OCCUPATIONAL THERAPIST

## 2021-07-21 NOTE — TELEPHONE ENCOUNTER
Please call and check on patient and schedule follow-up if he is having any GI complaints; if he has no GI complaints he may follow-up as needed and recall colonoscopy for 10 years

## 2021-07-21 NOTE — PROGRESS NOTES
Occupational Therapy Daily Treatment Note      Patient: Abelino Canales   : 1965  Referring practitioner: John Corral, *  Date of Initial Visit: No linked episodes  Today's Date: 2021  Patient seen for Visit count could not be calculated. Make sure you are using a visit which is associated with an episode. sessions  No diagnosis found.       Abelino Canales reports I am having some finger shaking and aching mid forearm (along ulnar nerve path)        Objective   See Exercise, Manual, and Modality Logs for complete treatment.      elbow  60 supination  90 pronation      Assessment/Plan  Pt having some shaking in SF with Adduction       Cont per POC    Pt is making significant progress with AROM and pain control. Continues to have intermittent numbness along ulnar nerve, but pain is improving.  Scar sensitivity is improving.  Progress with therapy and HEP.         Timed:  Manual Therapy:    0     mins  69764;  Therapeutic Exercise:    25    mins  24983;     Neuromuscular Davie:    5   mins  61487;    Ultrasound:     0     mins  52498;    Electrical Stimulation:    0     mins  35647 ( );    Untimed:  Electrical Stimulation:    0     mins  74977 ( );  Fluidotherapy:        0    mins  92671    Timed Treatment:   30   mins   Total Treatment:     30   mins  TAURUS Jarquin, OTR/L,CHT  Occupational Therapist, Certified Hand Therapist

## 2021-07-27 ENCOUNTER — TREATMENT (OUTPATIENT)
Dept: PHYSICAL THERAPY | Facility: CLINIC | Age: 56
End: 2021-07-27

## 2021-07-27 DIAGNOSIS — M25.621 STIFFNESS OF RIGHT ELBOW JOINT: ICD-10-CM

## 2021-07-27 DIAGNOSIS — G56.21 CUBITAL TUNNEL SYNDROME ON RIGHT: Primary | ICD-10-CM

## 2021-07-27 DIAGNOSIS — M25.521 RIGHT ELBOW PAIN: ICD-10-CM

## 2021-07-27 PROCEDURE — 97112 NEUROMUSCULAR REEDUCATION: CPT | Performed by: OCCUPATIONAL THERAPIST

## 2021-07-27 PROCEDURE — 97110 THERAPEUTIC EXERCISES: CPT | Performed by: OCCUPATIONAL THERAPIST

## 2021-07-27 NOTE — PROGRESS NOTES
Occupational Therapy Daily Treatment Note      Patient: Abelino Canales   : 1965  Referring practitioner: John Corral, *  Date of Initial Visit: Type: THERAPY  Noted: 2021  Today's Date: 2021  Patient seen for 6 sessions    ICD-10-CM ICD-9-CM   1. Cubital tunnel syndrome on right  G56.21 354.2   2. Stiffness of right elbow joint  M25.621 719.52   3. Right elbow pain  M25.521 719.42          Abelino Canales reports I am back to work on light duty.  He is putting screws in a plastic piece and it increased pain in R elbow.  5/10 pain this date.      Objective   See Exercise, Manual, and Modality Logs for complete treatment.       Assessment/Plan    Scar tissue continues to be tight over ulnar elbow.  Needs to continue with scar massage.    Cont per POC           Timed:  Manual Therapy:    5     mins  11808;  Therapeutic Exercise:    15     mins  53932;     Neuromuscular Davie:   10    mins  72063;    Ultrasound:     0     mins  34370;    Electrical Stimulation:    0     mins  65219    Untimed:  Electrical Stimulation:    0     mins  09401 ( );  Fluidotherapy:        0    mins  15915    Timed Treatment:   30   mins   Total Treatment:     30   mins  TAURUS Jarquin, OTR/L,CHT  Occupational Therapist, Certified Hand Therapist

## 2021-07-28 ENCOUNTER — TELEPHONE (OUTPATIENT)
Dept: PHYSICAL THERAPY | Facility: CLINIC | Age: 56
End: 2021-07-28

## 2021-08-02 ENCOUNTER — TREATMENT (OUTPATIENT)
Dept: PHYSICAL THERAPY | Facility: CLINIC | Age: 56
End: 2021-08-02

## 2021-08-02 DIAGNOSIS — G56.21 CUBITAL TUNNEL SYNDROME ON RIGHT: Primary | ICD-10-CM

## 2021-08-02 DIAGNOSIS — M25.521 RIGHT ELBOW PAIN: ICD-10-CM

## 2021-08-02 DIAGNOSIS — M25.621 STIFFNESS OF RIGHT ELBOW JOINT: ICD-10-CM

## 2021-08-02 PROCEDURE — 97110 THERAPEUTIC EXERCISES: CPT | Performed by: OCCUPATIONAL THERAPIST

## 2021-08-02 NOTE — PROGRESS NOTES
Occupational Therapy Daily Treatment Note      Patient: Abelino Canales   : 1965  Referring practitioner: John Corral, *  Date of Initial Visit: Type: THERAPY  Noted: 2021  Today's Date: 2021  Patient seen for 7 sessions    ICD-10-CM ICD-9-CM   1. Cubital tunnel syndrome on right  G56.21 354.2   2. Stiffness of right elbow joint  M25.621 719.52   3. Right elbow pain  M25.521 719.42          Abelino Canales reports it is sore today after working Saturday. 4/10 today.      Objective   See Exercise, Manual, and Modality Logs for complete treatment.       Assessment/Plan  Pt had decreased tolerance to advance strengthening secondary to increased pain this date.       Cont per POC           Timed:  Manual Therapy:    0     mins  76545;  Therapeutic Exercise:    25     mins  75653;     Neuromuscular Davie:    5    mins  33297;    Ultrasound:     0     mins  03411;    Electrical Stimulation:    0     mins  77265;    Untimed:  Electrical Stimulation:    0     mins  80052 ( );  Fluidotherapy:        0    mins  45083    Timed Treatment:   30   mins   Total Treatment:     30   mins  TAURUS Jarquin, OTR/L,CHT  Occupational Therapist, Certified Hand Therapist

## 2021-08-04 ENCOUNTER — TREATMENT (OUTPATIENT)
Dept: PHYSICAL THERAPY | Facility: CLINIC | Age: 56
End: 2021-08-04

## 2021-08-04 DIAGNOSIS — G56.21 CUBITAL TUNNEL SYNDROME ON RIGHT: Primary | ICD-10-CM

## 2021-08-04 DIAGNOSIS — M25.521 RIGHT ELBOW PAIN: ICD-10-CM

## 2021-08-04 DIAGNOSIS — M25.621 STIFFNESS OF RIGHT ELBOW JOINT: ICD-10-CM

## 2021-08-04 PROCEDURE — 97014 ELECTRIC STIMULATION THERAPY: CPT | Performed by: OCCUPATIONAL THERAPIST

## 2021-08-04 PROCEDURE — 97110 THERAPEUTIC EXERCISES: CPT | Performed by: OCCUPATIONAL THERAPIST

## 2021-08-04 NOTE — PROGRESS NOTES
Occupational Therapy Daily Treatment Note      Patient: Abelino Canales   : 1965  Referring practitioner: John Corral, *  Date of Initial Visit: Type: THERAPY  Noted: 2021  Today's Date: 2021  Patient seen for 8 sessions    ICD-10-CM ICD-9-CM   1. Cubital tunnel syndrome on right  G56.21 354.2   2. Stiffness of right elbow joint  M25.621 719.52   3. Right elbow pain  M25.521 719.42          Abelino Canales reports work is just keeping it aggravated.      Objective   See Exercise, Manual, and Modality Logs for complete treatment.       Assessment/Plan  Pt able to reduce pain with desensitization and pain mgmt techniques.  Work tasks cause increase in pain      Cont per POC           Timed:  Manual Therapy:    0     mins  00665;  Therapeutic Exercise:    20     mins  34314;     Neuromuscular Davie:    0    mins  13261;    Ultrasound:     0     mins  16265;    Electrical Stimulation:    0     mins  63478;    Untimed:  Electrical Stimulation:    10     mins  31157 ( );  Fluidotherapy:        0    mins  07844    Timed Treatment:   20   mins   Total Treatment:     30   mins  TAURUS Jarquin, OTR/L,CHT  Occupational Therapist, Certified Hand Therapist

## 2021-08-09 ENCOUNTER — TREATMENT (OUTPATIENT)
Dept: PHYSICAL THERAPY | Facility: CLINIC | Age: 56
End: 2021-08-09

## 2021-08-09 DIAGNOSIS — M25.621 STIFFNESS OF RIGHT ELBOW JOINT: ICD-10-CM

## 2021-08-09 DIAGNOSIS — G56.21 CUBITAL TUNNEL SYNDROME ON RIGHT: Primary | ICD-10-CM

## 2021-08-09 DIAGNOSIS — M25.521 RIGHT ELBOW PAIN: ICD-10-CM

## 2021-08-09 PROCEDURE — 97110 THERAPEUTIC EXERCISES: CPT | Performed by: OCCUPATIONAL THERAPIST

## 2021-08-09 NOTE — PROGRESS NOTES
Re-Assessment / Re-Certification      Patient: Abelino Canales   : 1965  Diagnosis/ICD-10 Code:  Cubital tunnel syndrome on right [G56.21]  Referring practitioner: John Corral, *  Date of Initial Visit: Type: THERAPY  Noted: 2021  Today's Date: 2021  Patient seen for 9 sessions      Subjective:   Abelino Canales reports: I am still having 4/10 pain in forearm down to pinky.  Feel a little crunch in the elbow.   Subjective Questionnaire: QuickDASH: 31  Clinical Progress: improved  Home Program Compliance: Yes  Treatment has included: therapeutic exercise, neuromuscular re-education, manual therapy and therapeutic activity    Subjective   Objective          Neurological Testing     Sensation     Elbow     Right Elbow   Paresthesia: light touch    Comments   Right light touch: 2.83 SW along ulnar nerve, however continues to report pain and tingling along that path.     Active Range of Motion     Right Elbow   Flexion: 145 degrees   Extension: 15 degrees   Forearm supination: 65 degrees   Forearm pronation: 90 degrees     Strength/Myotome Testing     Left Wrist/Hand      (2nd hand position)   lbs: 45    Right Wrist/Hand      (2nd hand position)   lbs: 22      Assessment/Plan    Visit Diagnoses:    ICD-10-CM ICD-9-CM   1. Cubital tunnel syndrome on right  G56.21 354.2   2. Stiffness of right elbow joint  M25.621 719.52   3. Right elbow pain  M25.521 719.42       Progress toward previous goals: Partially Met    1. The patient complains of pain in the R elbow                  LTG 1: 12 weeks:  The patient will report a pain rating of 0/10 or better in order to improve sleep quality and tolerance to performance of activities of daily living.                                  STATUS:  NOT MET                  STG 1a: 4weeks:  The patient will report a pain rating of 6/10 or better.                                   STATUS:  MET  2. The patient has limited ROM of the R elbow                   LTG 2: 12 weeks:  The patient will demonstrate  0-140 degrees of CASTELLON to allow the patient to Reach and WB.                                  STATUS:  NOT MET                  STG 2a: 4 weeks:  The patient will demonstrate  degrees of CASTELLON.                                  STATUS:  MET                            3. The patient has limited strength of the R UE.                  LTG 3: 12 weeks:  The patient will demonstrate 45# in order to Return to work unrestricted.                                  STATUS:  NOT MET                  STG 3a: 4 weeks:  The patient will demonstrate tolerance to light strengthening without adverse reaction.                                  STATUS:  MET  4. Carrying, Moving, and Handling Objects Functional Limitation                                   LTG 4: 12 weeks:  The patient will demonstrate 1-19% limitation by achieving a score of 15 on the Quick DASH.                                  STATUS:  NOT MET                  STG 4a: 4 weeks:  The patient will demonstrate 40-59% limitation by achieving a score of 30 on the Quick DASH.                                    STATUS:  NOT MET                    TREATMENT: Orthotic fabrication/fitting/management and training, Manual therapy, therapeutic exercise, home exercise instruction, and modalities as needed to include: electrical stimulation, ultrasound, moist heat, paraffin, fluidotherapy and ice.    Recommendations: Continue as planned  Timeframe: 2 months  Prognosis to achieve goals: good    OT Signature: TAURUS Jarquin, OTR/L, CHT      Based upon review of the patient's progress and continued therapy plan, it is my medical opinion that Abelino Canales should continue occupational therapy treatment at [facility]  [address]  [place] [address] [phone].    Signature: __________________________________  John Priest MD    Timed:  Manual Therapy:    0     mins   73302;  Therapeutic Exercise:    25     mins  01753;     Neuromuscular Davie:    5    mins  69141;    Ultrasound:     0     mins  54811;    Electrical Stimulation:    0     mins  61090;    Untimed:  Electrical Stimulation:    0     mins  03673 ( );  Fluidotherapy:     0     mins  36453    Timed Treatment:   30   mins   Total Treatment:     30   mins

## 2021-08-11 ENCOUNTER — TREATMENT (OUTPATIENT)
Dept: PHYSICAL THERAPY | Facility: CLINIC | Age: 56
End: 2021-08-11

## 2021-08-11 DIAGNOSIS — M25.521 RIGHT ELBOW PAIN: ICD-10-CM

## 2021-08-11 DIAGNOSIS — M25.621 STIFFNESS OF RIGHT ELBOW JOINT: ICD-10-CM

## 2021-08-11 DIAGNOSIS — G56.21 CUBITAL TUNNEL SYNDROME ON RIGHT: Primary | ICD-10-CM

## 2021-08-11 PROCEDURE — 97110 THERAPEUTIC EXERCISES: CPT | Performed by: OCCUPATIONAL THERAPIST

## 2021-08-11 PROCEDURE — 97022 WHIRLPOOL THERAPY: CPT | Performed by: OCCUPATIONAL THERAPIST

## 2021-08-11 NOTE — PROGRESS NOTES
Occupational Therapy Daily Treatment Note      Patient: Abelino Canales   : 1965  Referring practitioner: John Corral, *  Date of Initial Visit: Type: THERAPY  Noted: 2021  Today's Date: 2021  Patient seen for 10 sessions    ICD-10-CM ICD-9-CM   1. Cubital tunnel syndrome on right  G56.21 354.2   2. Stiffness of right elbow joint  M25.621 719.52   3. Right elbow pain  M25.521 719.42          Abelino Canales reports pain in R elbow today is about 3-4/10.       Objective   See Exercise, Manual, and Modality Logs for complete treatment.       Assessment/Plan        Cont per POC           Timed:  Manual Therapy:    5     mins  22285;  Therapeutic Exercise:    10     mins  41077;     Neuromuscular Davie:    5    mins  14336;    Ultrasound:     0     mins  88945;    Electrical Stimulation:    0     mins  93224;    Untimed:  Electrical Stimulation:    0     mins  82489 ( );  Fluidotherapy:        10    mins  75343    Timed Treatment:   20   mins   Total Treatment:     30   mins  TAURUS Jarquin, OTR/L,CHT  Occupational Therapist, Certified Hand Therapist

## 2021-08-16 ENCOUNTER — TREATMENT (OUTPATIENT)
Dept: PHYSICAL THERAPY | Facility: CLINIC | Age: 56
End: 2021-08-16

## 2021-08-16 DIAGNOSIS — M25.521 RIGHT ELBOW PAIN: ICD-10-CM

## 2021-08-16 DIAGNOSIS — M25.621 STIFFNESS OF RIGHT ELBOW JOINT: ICD-10-CM

## 2021-08-16 DIAGNOSIS — G56.21 CUBITAL TUNNEL SYNDROME ON RIGHT: Primary | ICD-10-CM

## 2021-08-16 PROCEDURE — 97140 MANUAL THERAPY 1/> REGIONS: CPT | Performed by: OCCUPATIONAL THERAPIST

## 2021-08-16 PROCEDURE — 97110 THERAPEUTIC EXERCISES: CPT | Performed by: OCCUPATIONAL THERAPIST

## 2021-08-16 NOTE — PROGRESS NOTES
Occupational Therapy Daily Treatment Note      Patient: Abelino Canales   : 1965  Referring practitioner: John Corral, *  Date of Initial Visit: Type: THERAPY  Noted: 2021  Today's Date: 2021  Patient seen for 11 sessions    ICD-10-CM ICD-9-CM   1. Cubital tunnel syndrome on right  G56.21 354.2   2. Stiffness of right elbow joint  M25.621 719.52   3. Right elbow pain  M25.521 719.42          Abelino Canales reports the tape helped.  It is starting to get better.         Objective   See Exercise, Manual, and Modality Logs for complete treatment.       Assessment/Plan    Cont per POC           Timed:  Manual Therapy:    5     mins  83463;  Therapeutic Exercise:    15     mins  51099;     Neuromuscular Davie:    0    mins  27771;    Ultrasound:     0     mins  04215;    Electrical Stimulation:    0     mins  27620;    Untimed:  Electrical Stimulation:    10     mins  45934 ( );  Fluidotherapy:        0    mins  98263    Timed Treatment:   20   mins   Total Treatment:     30   mins  TAURUS Jarquin, OTR/L,CHT  Occupational Therapist, Certified Hand Therapist

## 2021-08-18 ENCOUNTER — TREATMENT (OUTPATIENT)
Dept: PHYSICAL THERAPY | Facility: CLINIC | Age: 56
End: 2021-08-18

## 2021-08-18 DIAGNOSIS — M25.521 RIGHT ELBOW PAIN: ICD-10-CM

## 2021-08-18 DIAGNOSIS — G56.21 CUBITAL TUNNEL SYNDROME ON RIGHT: Primary | ICD-10-CM

## 2021-08-18 DIAGNOSIS — M25.621 STIFFNESS OF RIGHT ELBOW JOINT: ICD-10-CM

## 2021-08-18 PROCEDURE — 97110 THERAPEUTIC EXERCISES: CPT | Performed by: OCCUPATIONAL THERAPIST

## 2021-08-18 PROCEDURE — 97140 MANUAL THERAPY 1/> REGIONS: CPT | Performed by: OCCUPATIONAL THERAPIST

## 2021-08-18 NOTE — PROGRESS NOTES
Occupational Therapy Daily Treatment Note      Patient: Abelino Canales   : 1965  Referring practitioner: John Corral, *  Date of Initial Visit: Type: THERAPY  Noted: 2021  Today's Date: 2021  Patient seen for 12 sessions    ICD-10-CM ICD-9-CM   1. Cubital tunnel syndrome on right  G56.21 354.2   2. Stiffness of right elbow joint  M25.621 719.52   3. Right elbow pain  M25.521 719.42          Abelino Canales reports I had burning pain when I woke up this morning.  But overall I think it is getting better.       Objective   See Exercise, Manual, and Modality Logs for complete treatment.       Assessment/Plan  Pt tolerating scar mgmt and strengthening better.    Cont per POC           Timed:  Manual Therapy:    5     mins  29038;  Therapeutic Exercise:    15     mins  43129;     Neuromuscular Davie:    0    mins  42497;    Ultrasound:     0     mins  23595;    Electrical Stimulation:    0     mins  84374;    Untimed:  Electrical Stimulation:    10     mins  90229 ( );  Fluidotherapy:        0    mins  48736    Timed Treatment:   20   mins   Total Treatment:     30   mins  TAURUS Jarquin, OTR/L,CHT  Occupational Therapist, Certified Hand Therapist

## 2021-08-23 ENCOUNTER — TREATMENT (OUTPATIENT)
Dept: PHYSICAL THERAPY | Facility: CLINIC | Age: 56
End: 2021-08-23

## 2021-08-23 DIAGNOSIS — G56.21 CUBITAL TUNNEL SYNDROME ON RIGHT: Primary | ICD-10-CM

## 2021-08-23 DIAGNOSIS — M25.621 STIFFNESS OF RIGHT ELBOW JOINT: ICD-10-CM

## 2021-08-23 DIAGNOSIS — M25.521 RIGHT ELBOW PAIN: ICD-10-CM

## 2021-08-23 PROCEDURE — 97014 ELECTRIC STIMULATION THERAPY: CPT | Performed by: OCCUPATIONAL THERAPIST

## 2021-08-23 PROCEDURE — 97110 THERAPEUTIC EXERCISES: CPT | Performed by: OCCUPATIONAL THERAPIST

## 2021-08-23 NOTE — PROGRESS NOTES
Occupational Therapy Daily Treatment Note      Patient: Abelino Canales   : 1965  Referring practitioner: John Corral, *  Date of Initial Visit: Type: THERAPY  Noted: 2021  Today's Date: 2021  Patient seen for 13 sessions    ICD-10-CM ICD-9-CM   1. Cubital tunnel syndrome on right  G56.21 354.2   2. Stiffness of right elbow joint  M25.621 719.52   3. Right elbow pain  M25.521 719.42          Abelino Canales reports I am doing better, but still having trouble getting it completely straight.      Objective          Active Range of Motion     Right Elbow   Flexion: 145 degrees   Extension: 15 degrees     Strength/Myotome Testing     Additional Strength Details  28#  strength R       See Exercise, Manual, and Modality Logs for complete treatment.       Assessment/Plan  Writing continues to be painful this weekend. Tedious/small fine motor tasks at work on light duty. Still having off and on pain.       Cont per POC           Timed:  Manual Therapy:    5     mins  79160;  Therapeutic Exercise:    25     mins  70687;     Neuromuscular Davie:    0    mins  59944;    Ultrasound:     0     mins  55160;    Electrical Stimulation:    0     mins  77360;    Untimed:  Electrical Stimulation:    10     mins  73725 ( );  Fluidotherapy:        0    mins  67496    Timed Treatment:   30   mins   Total Treatment:     40   mins  TAURUS Jarquin, OTR/L,CHT  Occupational Therapist, Certified Hand Therapist

## 2021-08-25 ENCOUNTER — TREATMENT (OUTPATIENT)
Dept: PHYSICAL THERAPY | Facility: CLINIC | Age: 56
End: 2021-08-25

## 2021-08-25 DIAGNOSIS — G56.21 CUBITAL TUNNEL SYNDROME ON RIGHT: Primary | ICD-10-CM

## 2021-08-25 DIAGNOSIS — M25.521 RIGHT ELBOW PAIN: ICD-10-CM

## 2021-08-25 DIAGNOSIS — M25.621 STIFFNESS OF RIGHT ELBOW JOINT: ICD-10-CM

## 2021-08-25 PROCEDURE — 97110 THERAPEUTIC EXERCISES: CPT | Performed by: OCCUPATIONAL THERAPIST

## 2021-08-25 PROCEDURE — 97014 ELECTRIC STIMULATION THERAPY: CPT | Performed by: OCCUPATIONAL THERAPIST

## 2021-09-02 ENCOUNTER — TREATMENT (OUTPATIENT)
Dept: PHYSICAL THERAPY | Facility: CLINIC | Age: 56
End: 2021-09-02

## 2021-09-02 DIAGNOSIS — M25.521 RIGHT ELBOW PAIN: ICD-10-CM

## 2021-09-02 DIAGNOSIS — G56.21 CUBITAL TUNNEL SYNDROME ON RIGHT: Primary | ICD-10-CM

## 2021-09-02 DIAGNOSIS — M25.621 STIFFNESS OF RIGHT ELBOW JOINT: ICD-10-CM

## 2021-09-02 PROCEDURE — 97110 THERAPEUTIC EXERCISES: CPT | Performed by: OCCUPATIONAL THERAPIST

## 2021-09-02 PROCEDURE — 97014 ELECTRIC STIMULATION THERAPY: CPT | Performed by: OCCUPATIONAL THERAPIST

## 2021-09-02 NOTE — PROGRESS NOTES
Occupational Therapy Daily Treatment Note      Patient: Abelino Canales   : 1965  Referring practitioner: John Corral, *  Date of Initial Visit: Type: THERAPY  Noted: 2021  Today's Date: 2021  Patient seen for 15 sessions    ICD-10-CM ICD-9-CM   1. Cubital tunnel syndrome on right  G56.21 354.2   2. Stiffness of right elbow joint  M25.621 719.52   3. Right elbow pain  M25.521 719.42          Abelino Canales reports 3/10 pain today.  Pt reports it is slowly getting better    Objective   See Exercise, Manual, and Modality Logs for complete treatment.       Assessment/Plan  Pt progressing with neuro re-ed, pain is improving.    Cont per POC           Timed:  Manual Therapy:    0     mins  70172;  Therapeutic Exercise:    20     mins  85061;     Neuromuscular Davie:    0    mins  52286;    Ultrasound:     0     mins  90825;    Electrical Stimulation:    0     mins  88342;    Untimed:  Electrical Stimulation:    10     mins  64831 ( );  Fluidotherapy:        0    mins  31082    Timed Treatment:   20   mins   Total Treatment:     30   mins  TAURUS Jarquin, OTR/L,CHT  Occupational Therapist, Certified Hand Therapist

## 2021-09-09 ENCOUNTER — TREATMENT (OUTPATIENT)
Dept: PHYSICAL THERAPY | Facility: CLINIC | Age: 56
End: 2021-09-09

## 2021-09-09 DIAGNOSIS — M25.521 RIGHT ELBOW PAIN: ICD-10-CM

## 2021-09-09 DIAGNOSIS — M25.621 STIFFNESS OF RIGHT ELBOW JOINT: ICD-10-CM

## 2021-09-09 DIAGNOSIS — G56.21 CUBITAL TUNNEL SYNDROME ON RIGHT: Primary | ICD-10-CM

## 2021-09-09 PROCEDURE — 97014 ELECTRIC STIMULATION THERAPY: CPT | Performed by: OCCUPATIONAL THERAPIST

## 2021-09-09 PROCEDURE — 97110 THERAPEUTIC EXERCISES: CPT | Performed by: OCCUPATIONAL THERAPIST

## 2021-09-09 PROCEDURE — 97140 MANUAL THERAPY 1/> REGIONS: CPT | Performed by: OCCUPATIONAL THERAPIST

## 2021-09-09 NOTE — PROGRESS NOTES
Occupational Therapy Daily Treatment Note      Patient: Abelino Canales   : 1965  Referring practitioner: John Corral, *  Date of Initial Visit: Type: THERAPY  Noted: 2021  Today's Date: 2021  Patient seen for 16 sessions    ICD-10-CM ICD-9-CM   1. Cubital tunnel syndrome on right  G56.21 354.2   2. Stiffness of right elbow joint  M25.621 719.52   3. Right elbow pain  M25.521 719.42          Abelino Canales reports 4/10.  I'm still feeling sharp pains in the elbow.     Objective   See Exercise, Manual, and Modality Logs for complete treatment.       Assessment/Plan    Pt tolerated cupping well with increased tolerance for functional tasks following.     Cont per POC           Timed:  Manual Therapy:    10     mins  93142;  Therapeutic Exercise:   10     mins  31822;     Neuromuscular Davie:    0    mins  65872;    Ultrasound:     0     mins  79573;    Electrical Stimulation:    0     mins  76287;    Untimed:  Electrical Stimulation:    10     mins  37797 ( );  Fluidotherapy:        0    mins  04113    Timed Treatment:   20   mins   Total Treatment:     30   mins  TAURUS Jarquin, OTR/L,CHT  Occupational Therapist, Certified Hand Therapist

## 2021-09-13 ENCOUNTER — TREATMENT (OUTPATIENT)
Dept: PHYSICAL THERAPY | Facility: CLINIC | Age: 56
End: 2021-09-13

## 2021-09-13 DIAGNOSIS — M25.521 RIGHT ELBOW PAIN: ICD-10-CM

## 2021-09-13 DIAGNOSIS — M25.621 STIFFNESS OF RIGHT ELBOW JOINT: ICD-10-CM

## 2021-09-13 DIAGNOSIS — G56.21 CUBITAL TUNNEL SYNDROME ON RIGHT: Primary | ICD-10-CM

## 2021-09-13 PROCEDURE — 97014 ELECTRIC STIMULATION THERAPY: CPT | Performed by: OCCUPATIONAL THERAPIST

## 2021-09-13 PROCEDURE — 97110 THERAPEUTIC EXERCISES: CPT | Performed by: OCCUPATIONAL THERAPIST

## 2021-09-13 PROCEDURE — 97140 MANUAL THERAPY 1/> REGIONS: CPT | Performed by: OCCUPATIONAL THERAPIST

## 2021-09-13 NOTE — PROGRESS NOTES
Occupational Therapy Daily Treatment Note      Patient: Abelino Canales   : 1965  Referring practitioner: John Corral, *  Date of Initial Visit: Type: THERAPY  Noted: 2021  Today's Date: 2021  Patient seen for 17 sessions    ICD-10-CM ICD-9-CM   1. Cubital tunnel syndrome on right  G56.21 354.2   2. Stiffness of right elbow joint  M25.621 719.52   3. Right elbow pain  M25.521 719.42          Abelino Canales reports the cupping seems to help    Objective   See Exercise, Manual, and Modality Logs for complete treatment.   Kinesiotape applied for scar tissue proximal to distal oscillating over incision 50% pull.     Assessment/Plan  Pt progressing with strengthening.       Cont per POC           Timed:  Manual Therapy:    10     mins  48582;  Therapeutic Exercise:    15     mins  51833;     Neuromuscular Davie:    5    mins  79098;    Ultrasound:     0     mins  70329;    Electrical Stimulation:    0     mins  92869;    Untimed:  Electrical Stimulation:    10     mins  65839 ( );  Fluidotherapy:        0    mins  90544    Timed Treatment:   30   mins   Total Treatment:     40   mins  TAURUS Jarquin, OTR/L,CHT  Occupational Therapist, Certified Hand Therapist

## 2021-09-15 ENCOUNTER — TREATMENT (OUTPATIENT)
Dept: PHYSICAL THERAPY | Facility: CLINIC | Age: 56
End: 2021-09-15

## 2021-09-15 DIAGNOSIS — G56.21 CUBITAL TUNNEL SYNDROME ON RIGHT: Primary | ICD-10-CM

## 2021-09-15 DIAGNOSIS — M25.521 RIGHT ELBOW PAIN: ICD-10-CM

## 2021-09-15 DIAGNOSIS — M25.621 STIFFNESS OF RIGHT ELBOW JOINT: ICD-10-CM

## 2021-09-15 PROCEDURE — 97110 THERAPEUTIC EXERCISES: CPT | Performed by: OCCUPATIONAL THERAPIST

## 2021-09-15 PROCEDURE — 97140 MANUAL THERAPY 1/> REGIONS: CPT | Performed by: OCCUPATIONAL THERAPIST

## 2021-09-15 PROCEDURE — 97014 ELECTRIC STIMULATION THERAPY: CPT | Performed by: OCCUPATIONAL THERAPIST

## 2021-09-15 NOTE — PROGRESS NOTES
Re-Assessment / Re-Certification      Patient: Abelino Canales   : 1965  Diagnosis/ICD-10 Code:  Cubital tunnel syndrome on right [G56.21]  Referring practitioner: John Corral, *  Date of Initial Visit: Type: THERAPY  Noted: 2021  Today's Date: 9/15/2021  Patient seen for 18 sessions      Subjective:   Abelino Canales reports: The tape helped, it is just still sore 4/10    Clinical Progress: improved  Home Program Compliance: Yes  Treatment has included: therapeutic exercise, neuromuscular re-education, manual therapy, therapeutic activity, electrical stimulation and moist heat    Subjective   Objective          Active Range of Motion     Right Elbow   Flexion: 150 degrees   Extension: 5 degrees     Strength/Myotome Testing     Additional Strength Details  25#  R      Assessment/Plan    Visit Diagnoses:    ICD-10-CM ICD-9-CM   1. Cubital tunnel syndrome on right  G56.21 354.2   2. Stiffness of right elbow joint  M25.621 719.52   3. Right elbow pain  M25.521 719.42       Progress toward previous goals: Partially Met    1. The patient complains of pain in the R elbow                  LTG 1: 12 weeks:  The patient will report a pain rating of 0/10 or better in order to improve sleep quality and tolerance to performance of activities of daily living.                                  STATUS:  NOT MET                  STG 1a: 4weeks:  The patient will report a pain rating of 6/10 or better.                                   STATUS:  MET  2. The patient has limited ROM of the R elbow                  LTG 2: 12 weeks:  The patient will demonstrate  0-140 degrees of CASTELLON to allow the patient to Reach and WB.                                  STATUS:  NOT MET                  STG 2a: 4 weeks:  The patient will demonstrate  degrees of CASTELLON.                                  STATUS:  MET                            3. The patient has limited strength of the R UE.                  LTG 3: 12 weeks:  The  patient will demonstrate 45# in order to Return to work unrestricted.                                  STATUS:  NOT MET                  STG 3a: 4 weeks:  The patient will demonstrate tolerance to light strengthening without adverse reaction.                                  STATUS:  MET  4. Carrying, Moving, and Handling Objects Functional Limitation                                   LTG 4: 12 weeks:  The patient will demonstrate 1-19% limitation by achieving a score of 15 on the Quick DASH.                                  STATUS:  NOT MET                  STG 4a: 4 weeks:  The patient will demonstrate 40-59% limitation by achieving a score of 30 on the Quick DASH.                                    STATUS:  NOT MET      Recommendations: Continue as planned  Timeframe: 1 month  Prognosis to achieve goals: good    OT Signature: Alison Carr, OTD, OTR/L, CHT      Based upon review of the patient's progress and continued therapy plan, it is my medical opinion that Abelino Canales should continue occupational therapy treatment at [facility]  [address] [place] [address]  [place] [address] [phone].    Signature: __________________________________  John Priest MD  Timed:  Manual Therapy:    10     mins  47637;  Therapeutic Exercise:    15     mins  15719;     Neuromuscular Davie:    0    mins  23247;    Ultrasound:     0     mins  22211;    Electrical Stimulation:    0     mins  73194;    Untimed:  Electrical Stimulation:    10     mins  99324 ( );  Fluidotherapy:     0     mins  98377    Timed Treatment:   25   mins   Total Treatment:     35   mins

## 2021-09-20 ENCOUNTER — TREATMENT (OUTPATIENT)
Dept: PHYSICAL THERAPY | Facility: CLINIC | Age: 56
End: 2021-09-20

## 2021-09-20 DIAGNOSIS — M25.621 STIFFNESS OF RIGHT ELBOW JOINT: ICD-10-CM

## 2021-09-20 DIAGNOSIS — M25.521 RIGHT ELBOW PAIN: ICD-10-CM

## 2021-09-20 DIAGNOSIS — G56.21 CUBITAL TUNNEL SYNDROME ON RIGHT: Primary | ICD-10-CM

## 2021-09-20 PROCEDURE — 97140 MANUAL THERAPY 1/> REGIONS: CPT | Performed by: OCCUPATIONAL THERAPIST

## 2021-09-20 PROCEDURE — 97110 THERAPEUTIC EXERCISES: CPT | Performed by: OCCUPATIONAL THERAPIST

## 2021-09-20 NOTE — PROGRESS NOTES
Occupational Therapy Daily Treatment Note      Patient: Abelino Canales   : 1965  Referring practitioner: John Corral, *  Date of Initial Visit: Type: THERAPY  Noted: 2021  Today's Date: 2021  Patient seen for 19 sessions    ICD-10-CM ICD-9-CM   1. Cubital tunnel syndrome on right  G56.21 354.2   2. Stiffness of right elbow joint  M25.621 719.52   3. Right elbow pain  M25.521 719.42          Abelino Canales reports The morning it is still stiff.  But overall feeling better 3/10 pain, along forearm and ulnar side of hand.      Objective   See Exercise, Manual, and Modality Logs for complete treatment.   kinesiotape applied to ulnar nerve for support from .    Assessment/Plan  Pt progressing with strengthening and tolerance for functional tasks.  Pt is having decreased overall pain.     Cont per POC           Timed:  Manual Therapy:    0     mins  54731;  Therapeutic Exercise:    15     mins  41503;     Neuromuscular Davie:    5    mins  93144;    Ultrasound:     0     mins  14305;    Electrical Stimulation:    0     mins  40249;    Untimed:  Electrical Stimulation:    10     mins  19068 ( );  Fluidotherapy:        0    mins  98223    Timed Treatment:   20   mins   Total Treatment:     30   mins  TAURUS Jarquin, OTR/L,CHT  Occupational Therapist, Certified Hand Therapist

## 2021-09-22 ENCOUNTER — TREATMENT (OUTPATIENT)
Dept: PHYSICAL THERAPY | Facility: CLINIC | Age: 56
End: 2021-09-22

## 2021-09-22 DIAGNOSIS — M25.621 STIFFNESS OF RIGHT ELBOW JOINT: ICD-10-CM

## 2021-09-22 DIAGNOSIS — M25.521 RIGHT ELBOW PAIN: ICD-10-CM

## 2021-09-22 DIAGNOSIS — G56.21 CUBITAL TUNNEL SYNDROME ON RIGHT: Primary | ICD-10-CM

## 2021-09-22 PROCEDURE — 97014 ELECTRIC STIMULATION THERAPY: CPT | Performed by: OCCUPATIONAL THERAPIST

## 2021-09-22 PROCEDURE — 97110 THERAPEUTIC EXERCISES: CPT | Performed by: OCCUPATIONAL THERAPIST

## 2021-09-22 NOTE — PROGRESS NOTES
Occupational Therapy Daily Treatment Note      Patient: Abelino Canales   : 1965  Referring practitioner: John Corral, *  Date of Initial Visit: Type: THERAPY  Noted: 2021  Today's Date: 2021  Patient seen for 20 sessions    ICD-10-CM ICD-9-CM   1. Cubital tunnel syndrome on right  G56.21 354.2   2. Stiffness of right elbow joint  M25.621 719.52   3. Right elbow pain  M25.521 719.42          Abelino Canales reports it is feeling better in the elbow, but my hand still hurts 3/10      Objective   See Exercise, Manual, and Modality Logs for complete treatment.       Assessment/Plan  Pt progressing with strengthening      Cont per POC           Timed:  Manual Therapy:    0     mins  83211;  Therapeutic Exercise:    20     mins  05182;     Neuromuscular Davie:    0    mins  13012;    Ultrasound:     0     mins  69700;    Electrical Stimulation:    0     mins  87656;    Untimed:  Electrical Stimulation:    10     mins  72016 ( );  Fluidotherapy:        0    mins  72466    Timed Treatment:   20   mins   Total Treatment:     30   mins  TAURUS Jarquin, OTR/L,CHT  Occupational Therapist, Certified Hand Therapist

## 2021-09-27 ENCOUNTER — TREATMENT (OUTPATIENT)
Dept: PHYSICAL THERAPY | Facility: CLINIC | Age: 56
End: 2021-09-27

## 2021-09-27 DIAGNOSIS — M25.621 STIFFNESS OF RIGHT ELBOW JOINT: ICD-10-CM

## 2021-09-27 DIAGNOSIS — M25.521 RIGHT ELBOW PAIN: ICD-10-CM

## 2021-09-27 DIAGNOSIS — G56.21 CUBITAL TUNNEL SYNDROME ON RIGHT: Primary | ICD-10-CM

## 2021-09-27 PROCEDURE — 97110 THERAPEUTIC EXERCISES: CPT | Performed by: OCCUPATIONAL THERAPIST

## 2021-09-27 PROCEDURE — 97014 ELECTRIC STIMULATION THERAPY: CPT | Performed by: OCCUPATIONAL THERAPIST

## 2021-09-27 NOTE — PROGRESS NOTES
Occupational Therapy Daily Treatment Note      Patient: Abelino Canales   : 1965  Referring practitioner: John Corral, *  Date of Initial Visit: Type: THERAPY  Noted: 2021  Today's Date: 2021  Patient seen for 21 sessions    ICD-10-CM ICD-9-CM   1. Cubital tunnel syndrome on right  G56.21 354.2   2. Stiffness of right elbow joint  M25.621 719.52   3. Right elbow pain  M25.521 719.42          Abelino Canales reports its an achy pain today.  I felt better this morning, then I started to move and it is hurting again 3/10.      Objective   See Exercise, Manual, and Modality Logs for complete treatment.       Assessment/Plan  Pt progressing with functional lifting and gripping strength.       Cont per POC           Timed:  Manual Therapy:    0     mins  30154;  Therapeutic Exercise:    25     mins  07266;     Neuromuscular Davie:    0    mins  91787;    Ultrasound:     0     mins  28368;    Electrical Stimulation:    0     mins  81862;    Untimed:  Electrical Stimulation:    10     mins  70022 ( );  Fluidotherapy:        0    mins  54040    Timed Treatment:   25   mins   Total Treatment:     35   mins  TAURUS Jarquin, OTR/L,CHT  Occupational Therapist, Certified Hand Therapist

## 2021-09-29 ENCOUNTER — TREATMENT (OUTPATIENT)
Dept: PHYSICAL THERAPY | Facility: CLINIC | Age: 56
End: 2021-09-29

## 2021-09-29 DIAGNOSIS — M25.621 STIFFNESS OF RIGHT ELBOW JOINT: ICD-10-CM

## 2021-09-29 DIAGNOSIS — G56.21 CUBITAL TUNNEL SYNDROME ON RIGHT: Primary | ICD-10-CM

## 2021-09-29 DIAGNOSIS — M25.521 RIGHT ELBOW PAIN: ICD-10-CM

## 2021-09-29 PROCEDURE — 97110 THERAPEUTIC EXERCISES: CPT | Performed by: OCCUPATIONAL THERAPIST

## 2021-09-29 NOTE — PROGRESS NOTES
Occupational Therapy Daily Treatment Note      Patient: Abelino Canales   : 1965  Referring practitioner: John Corral, *  Date of Initial Visit: Type: THERAPY  Noted: 2021  Today's Date: 2021  Patient seen for 22 sessions    ICD-10-CM ICD-9-CM   1. Cubital tunnel syndrome on right  G56.21 354.2   2. Stiffness of right elbow joint  M25.621 719.52   3. Right elbow pain  M25.521 719.42          Abelino Canales reports he is aching today.  My other arm is even bothering me.    Objective   See Exercise, Manual, and Modality Logs for complete treatment.       Assessment/Plan        Cont per POC           Timed:  Manual Therapy:    0     mins  44781;  Therapeutic Exercise:    25     mins  33927;     Neuromuscular Davie:    0    mins  89512;    Ultrasound:     0     mins  12510;    Electrical Stimulation:    0     mins  97926;    Untimed:  Electrical Stimulation:    0     mins  32874 ( );  Fluidotherapy:        0    mins  95317    Timed Treatment:   25   mins   Total Treatment:     25   mins  TAURUS Jarquin, OTR/L,CHT  Occupational Therapist, Certified Hand Therapist

## 2021-10-05 ENCOUNTER — TREATMENT (OUTPATIENT)
Dept: PHYSICAL THERAPY | Facility: CLINIC | Age: 56
End: 2021-10-05

## 2021-10-05 DIAGNOSIS — M25.621 STIFFNESS OF RIGHT ELBOW JOINT: ICD-10-CM

## 2021-10-05 DIAGNOSIS — M25.521 RIGHT ELBOW PAIN: ICD-10-CM

## 2021-10-05 DIAGNOSIS — G56.21 CUBITAL TUNNEL SYNDROME ON RIGHT: Primary | ICD-10-CM

## 2021-10-05 PROCEDURE — 97014 ELECTRIC STIMULATION THERAPY: CPT | Performed by: OCCUPATIONAL THERAPIST

## 2021-10-05 PROCEDURE — 97110 THERAPEUTIC EXERCISES: CPT | Performed by: OCCUPATIONAL THERAPIST

## 2021-10-05 NOTE — PROGRESS NOTES
Occupational Therapy Daily Treatment Note      Patient: Abelino Canales   : 1965  Referring practitioner: John Corral, *  Date of Initial Visit: Type: THERAPY  Noted: 2021  Today's Date: 10/5/2021  Patient seen for 23 sessions    ICD-10-CM ICD-9-CM   1. Cubital tunnel syndrome on right  G56.21 354.2   2. Stiffness of right elbow joint  M25.621 719.52   3. Right elbow pain  M25.521 719.42          Abelino Canales reports I am really hurting today.  I am not sure why though    Objective   See Exercise, Manual, and Modality Logs for complete treatment.   kinesiotape applied to ulnar nerve path for support and pain relief    Assessment/Plan  Pt  Progressing with strengthening, continues to have functional fatigue and weakness.   Cont per POC           Timed:  Manual Therapy:    0     mins  66020;  Therapeutic Exercise:    15     mins  79116;     Neuromuscular Davie:    5    mins  61618;    Ultrasound:     0     mins  45763;    Electrical Stimulation:    0     mins  50089;    Untimed:  Electrical Stimulation:    10     mins  97601 ( );  Fluidotherapy:        0    mins  24216    Timed Treatment:   20   mins   Total Treatment:     30   mins  TAURUS Jarquin, OTR/L,CHT  Occupational Therapist, Certified Hand Therapist

## 2021-10-07 ENCOUNTER — TREATMENT (OUTPATIENT)
Dept: PHYSICAL THERAPY | Facility: CLINIC | Age: 56
End: 2021-10-07

## 2021-10-07 DIAGNOSIS — M25.621 STIFFNESS OF RIGHT ELBOW JOINT: ICD-10-CM

## 2021-10-07 DIAGNOSIS — G56.21 CUBITAL TUNNEL SYNDROME ON RIGHT: Primary | ICD-10-CM

## 2021-10-07 DIAGNOSIS — M25.521 RIGHT ELBOW PAIN: ICD-10-CM

## 2021-10-07 PROCEDURE — 97014 ELECTRIC STIMULATION THERAPY: CPT | Performed by: OCCUPATIONAL THERAPIST

## 2021-10-07 PROCEDURE — 97110 THERAPEUTIC EXERCISES: CPT | Performed by: OCCUPATIONAL THERAPIST

## 2021-10-07 NOTE — PROGRESS NOTES
Occupational Therapy Daily Treatment Note      Patient: Abelino Canales   : 1965  Referring practitioner: John Corral, *  Date of Initial Visit: Type: THERAPY  Noted: 2021  Today's Date: 10/7/2021  Patient seen for 24 sessions    ICD-10-CM ICD-9-CM   1. Cubital tunnel syndrome on right  G56.21 354.2   2. Stiffness of right elbow joint  M25.621 719.52   3. Right elbow pain  M25.521 719.42          Abelino Canales reports I am still sore.  It is getting stronger though.        Objective   See Exercise, Manual, and Modality Logs for complete treatment.   Increased both resistance and repetitions with functional pushing/pulling/strengthening tasks.     Assessment/Plan  Pt progressing with tolerance for increased resistance.       Cont per POC           Timed:  Manual Therapy:    0     mins  84008;  Therapeutic Exercise:    20     mins  06169;     Neuromuscular Davie:    0    mins  18674;    Ultrasound:     0     mins  16811;    Electrical Stimulation:    0     mins  18493;    Untimed:  Electrical Stimulation:    10     mins  75506 ( );  Fluidotherapy:        0    mins  54763    Timed Treatment:   20   mins   Total Treatment:     30   mins  TAURUS Jarquin, OTR/L,CHT  Occupational Therapist, Certified Hand Therapist

## 2021-10-11 ENCOUNTER — TREATMENT (OUTPATIENT)
Dept: PHYSICAL THERAPY | Facility: CLINIC | Age: 56
End: 2021-10-11

## 2021-10-11 DIAGNOSIS — G56.21 CUBITAL TUNNEL SYNDROME ON RIGHT: Primary | ICD-10-CM

## 2021-10-11 DIAGNOSIS — M25.621 STIFFNESS OF RIGHT ELBOW JOINT: ICD-10-CM

## 2021-10-11 DIAGNOSIS — M25.521 RIGHT ELBOW PAIN: ICD-10-CM

## 2021-10-11 PROCEDURE — 97014 ELECTRIC STIMULATION THERAPY: CPT | Performed by: OCCUPATIONAL THERAPIST

## 2021-10-11 PROCEDURE — 97110 THERAPEUTIC EXERCISES: CPT | Performed by: OCCUPATIONAL THERAPIST

## 2021-10-11 NOTE — PROGRESS NOTES
Occupational Therapy Daily Treatment Note      Patient: Abelino Canales   : 1965  Referring practitioner: John Corral, *  Date of Initial Visit: Type: THERAPY  Noted: 2021  Today's Date: 10/11/2021  Patient seen for 25 sessions    ICD-10-CM ICD-9-CM   1. Cubital tunnel syndrome on right  G56.21 354.2   2. Stiffness of right elbow joint  M25.621 719.52   3. Right elbow pain  M25.521 719.42          Abelino Canales reports I am doing alright 3-4/10 but doing laundry last night aggravated it.     Objective   See Exercise, Manual, and Modality Logs for complete treatment.       Assessment/Plan  Pt has some soreness with WB and pushing/pulling tasks.  Pain is along ulnar nerve at distal portion along ulnar wrist.       Cont per POC           Timed:  Manual Therapy:    0     mins  50480;  Therapeutic Exercise:    30     mins  82101;     Neuromuscular Daive:    0    mins  14753;    Ultrasound:     0     mins  39141;    Electrical Stimulation:    0     mins  66395;    Untimed:  Electrical Stimulation:    10     mins  60711 ( );  Fluidotherapy:        0    mins  59987    Timed Treatment:   30   mins   Total Treatment:     40   mins  TAURUS Jarquin, OTR/L,CHT  Occupational Therapist, Certified Hand Therapist   Home

## 2021-10-13 ENCOUNTER — TREATMENT (OUTPATIENT)
Dept: PHYSICAL THERAPY | Facility: CLINIC | Age: 56
End: 2021-10-13

## 2021-10-13 DIAGNOSIS — M25.621 STIFFNESS OF RIGHT ELBOW JOINT: ICD-10-CM

## 2021-10-13 DIAGNOSIS — M25.521 RIGHT ELBOW PAIN: ICD-10-CM

## 2021-10-13 DIAGNOSIS — G56.21 CUBITAL TUNNEL SYNDROME ON RIGHT: Primary | ICD-10-CM

## 2021-10-13 PROCEDURE — 97110 THERAPEUTIC EXERCISES: CPT | Performed by: OCCUPATIONAL THERAPIST

## 2021-10-13 NOTE — PROGRESS NOTES
Occupational Therapy Daily Treatment Note      Patient: Abelino Canales   : 1965  Referring practitioner: John Corral, *  Date of Initial Visit: Type: THERAPY  Noted: 2021  Today's Date: 10/13/2021  Patient seen for 26 sessions    ICD-10-CM ICD-9-CM   1. Cubital tunnel syndrome on right  G56.21 354.2   2. Stiffness of right elbow joint  M25.621 719.52   3. Right elbow pain  M25.521 719.42          Abelino Canales reports My arm was irritated last night, the nerve was painful.       Objective          Neurological Testing     Sensation     Elbow     Right Elbow   Intact: light touch    Comments   Right light touch: 2.83 SW along ulnar nerve path proximal and distal to incision.     Active Range of Motion     Right Elbow   Flexion: 150 degrees   Extension: 10 degrees   Forearm supination: 70 degrees   Forearm pronation: 85 degrees     Tests     Additional Tests Details  41#  strength on R and 65# on L      See Exercise, Manual, and Modality Logs for complete treatment.       Assessment/Plan        Cont per POC           Timed:  Manual Therapy:    0     mins  14942;  Therapeutic Exercise:    30     mins  36043;     Neuromuscular Davie:    0    mins  86294;    Ultrasound:     0     mins  23890;    Electrical Stimulation:    0     mins  28395;    Untimed:  Electrical Stimulation:    0     mins  38622 ( );  Fluidotherapy:        0    mins  63158    Timed Treatment:   30   mins   Total Treatment:     30   mins  TAURUS Jarquin, OTR/L,CHT  Occupational Therapist, Certified Hand Therapist

## 2021-10-21 ENCOUNTER — TREATMENT (OUTPATIENT)
Dept: PHYSICAL THERAPY | Facility: CLINIC | Age: 56
End: 2021-10-21

## 2021-10-21 DIAGNOSIS — M25.521 RIGHT ELBOW PAIN: ICD-10-CM

## 2021-10-21 DIAGNOSIS — M25.621 STIFFNESS OF RIGHT ELBOW JOINT: ICD-10-CM

## 2021-10-21 DIAGNOSIS — G56.21 CUBITAL TUNNEL SYNDROME ON RIGHT: Primary | ICD-10-CM

## 2021-10-21 PROCEDURE — 97530 THERAPEUTIC ACTIVITIES: CPT | Performed by: OCCUPATIONAL THERAPIST

## 2021-10-21 PROCEDURE — 97014 ELECTRIC STIMULATION THERAPY: CPT | Performed by: OCCUPATIONAL THERAPIST

## 2021-10-21 PROCEDURE — 97110 THERAPEUTIC EXERCISES: CPT | Performed by: OCCUPATIONAL THERAPIST

## 2021-10-25 ENCOUNTER — TREATMENT (OUTPATIENT)
Dept: PHYSICAL THERAPY | Facility: CLINIC | Age: 56
End: 2021-10-25

## 2021-10-25 DIAGNOSIS — M25.521 RIGHT ELBOW PAIN: ICD-10-CM

## 2021-10-25 DIAGNOSIS — M25.621 STIFFNESS OF RIGHT ELBOW JOINT: ICD-10-CM

## 2021-10-25 DIAGNOSIS — G56.21 CUBITAL TUNNEL SYNDROME ON RIGHT: Primary | ICD-10-CM

## 2021-10-25 PROCEDURE — 97112 NEUROMUSCULAR REEDUCATION: CPT | Performed by: OCCUPATIONAL THERAPIST

## 2021-10-25 PROCEDURE — 97110 THERAPEUTIC EXERCISES: CPT | Performed by: OCCUPATIONAL THERAPIST

## 2021-10-25 PROCEDURE — 97014 ELECTRIC STIMULATION THERAPY: CPT | Performed by: OCCUPATIONAL THERAPIST

## 2021-10-25 PROCEDURE — 97530 THERAPEUTIC ACTIVITIES: CPT | Performed by: OCCUPATIONAL THERAPIST

## 2021-10-25 NOTE — PROGRESS NOTES
Occupational Therapy Daily Treatment Note      Patient: Abelino Canales   : 1965  Referring practitioner: John Corral, *  Date of Initial Visit: Type: THERAPY  Noted: 2021  Today's Date: 10/25/2021  Patient seen for 28 sessions    ICD-10-CM ICD-9-CM   1. Cubital tunnel syndrome on right  G56.21 354.2   2. Stiffness of right elbow joint  M25.621 719.52   3. Right elbow pain  M25.521 719.42          Abelino Canales reports I had a good weekend, my elbow is at about 3/10 pain.  Sleeping better, just stiff in the morning.       Objective   See Exercise, Manual, and Modality Logs for complete treatment.   Continued neuro re-ed, pain mgmt, strengthening and functional re-ed to return patient to OF.  Kinesiotape applied for neuro re-ed, pain mgmt.    Assessment/Plan  Pt progressing with strengthening and functional use of R UE.      Cont per POC           Timed:  Manual Therapy:    0     mins  79528;  Therapeutic Exercise:    10     mins  10376;  Therapeutic Activity:    10     mins  30837;     Neuromuscular Davie:    10    mins  89771;    Ultrasound:     0     mins  41877;    Electrical Stimulation:    0     mins  61500;    Untimed:  Electrical Stimulation:    10     mins  80791 ( );  Fluidotherapy:        0    mins  39226  Paraffin:                          0    mins  94800    Timed Treatment:   30   mins   Total Treatment:     40   mins    OT SIGNATURE: TAURUS Jarquin, OTR/L, CHT     Electronically signed    [place] LICENSE: [id number]

## 2021-10-27 ENCOUNTER — TREATMENT (OUTPATIENT)
Dept: PHYSICAL THERAPY | Facility: CLINIC | Age: 56
End: 2021-10-27

## 2021-10-27 DIAGNOSIS — G56.21 CUBITAL TUNNEL SYNDROME ON RIGHT: Primary | ICD-10-CM

## 2021-10-27 DIAGNOSIS — M25.621 STIFFNESS OF RIGHT ELBOW JOINT: ICD-10-CM

## 2021-10-27 DIAGNOSIS — M25.521 RIGHT ELBOW PAIN: ICD-10-CM

## 2021-10-27 PROCEDURE — 97110 THERAPEUTIC EXERCISES: CPT | Performed by: OCCUPATIONAL THERAPIST

## 2021-10-27 PROCEDURE — 97112 NEUROMUSCULAR REEDUCATION: CPT | Performed by: OCCUPATIONAL THERAPIST

## 2021-10-27 PROCEDURE — 97014 ELECTRIC STIMULATION THERAPY: CPT | Performed by: OCCUPATIONAL THERAPIST

## 2021-10-27 PROCEDURE — 97530 THERAPEUTIC ACTIVITIES: CPT | Performed by: OCCUPATIONAL THERAPIST

## 2021-10-27 NOTE — PROGRESS NOTES
Occupational Therapy Daily Treatment Note      Patient: Abelino Canales   : 1965  Referring practitioner: John Corral, *  Date of Initial Visit: Type: THERAPY  Noted: 2021  Today's Date: 10/27/2021  Patient seen for 29 sessions    ICD-10-CM ICD-9-CM   1. Cubital tunnel syndrome on right  G56.21 354.2   2. Stiffness of right elbow joint  M25.621 719.52   3. Right elbow pain  M25.521 719.42          Abelino Canales reports I am hurting more today.  I lifted up some boxes yesterday and it is hurting today.       Objective          Active Range of Motion     Right Elbow   Flexion: 140 degrees   Extension: 15 degrees   Forearm supination: 75 degrees   Forearm pronation: 85 degrees     Strength/Myotome Testing     Right Wrist/Hand      (2nd hand position)   lbs: 55      See Exercise, Manual, and Modality Logs for complete treatment.       Assessment/Plan    1. The patient complains of pain in the R elbow                  LTG 1: 12 weeks:  The patient will report a pain rating of 0/10 or better in order to improve sleep quality and tolerance to performance of activities of daily living.                                  STATUS:  NOT MET                  STG 1a: 4weeks:  The patient will report a pain rating of 6/10 or better.                                   STATUS:  MET  2. The patient has limited ROM of the R elbow                  LTG 2: 12 weeks:  The patient will demonstrate  0-140 degrees of CASTELLON to allow the patient to Reach and WB.                                  STATUS:  NOT MET                  STG 2a: 4 weeks:  The patient will demonstrate  degrees of CASTELLON.                                  STATUS:  MET                            3. The patient has limited strength of the R UE.                  LTG 3: 12 weeks:  The patient will demonstrate 45# in order to Return to work unrestricted.                                  STATUS:  MET                  STG 3a: 4 weeks:  The patient will  demonstrate tolerance to light strengthening without adverse reaction.                                  STATUS:  MET  4. Carrying, Moving, and Handling Objects Functional Limitation                                   LTG 4: 12 weeks:  The patient will demonstrate 1-19% limitation by achieving a score of 15 on the Quick DASH.                                  STATUS:  NOT MET                  STG 4a: 4 weeks:  The patient will demonstrate 40-59% limitation by achieving a score of 30 on the Quick DASH.                                    STATUS:  NOT MET    D/C to HEP           Timed:  Manual Therapy:    0     mins  31403;  Therapeutic Exercise:    10     mins  30324;  Therapeutic Activity:    10     mins  95338;     Neuromuscular Davie:    10    mins  65409;    Ultrasound:     0     mins  12815;    Electrical Stimulation:    0     mins  84990;    Untimed:  Electrical Stimulation:    10     mins  87037 ( );  Fluidotherapy:        0    mins  57495  Paraffin:                          0    mins  53949    Timed Treatment:   30   mins   Total Treatment:     40   mins    OT SIGNATURE: TAURUS Jarquin, OTR/L, CHT     Electronically signed    [place] LICENSE: [id number]

## 2021-12-08 ENCOUNTER — OFFICE VISIT (OUTPATIENT)
Dept: ORTHOPEDIC SURGERY | Facility: CLINIC | Age: 56
End: 2021-12-08

## 2021-12-08 VITALS — OXYGEN SATURATION: 99 % | BODY MASS INDEX: 30.64 KG/M2 | WEIGHT: 251.6 LBS | HEIGHT: 76 IN | HEART RATE: 70 BPM

## 2021-12-08 DIAGNOSIS — M25.551 RIGHT HIP PAIN: Primary | ICD-10-CM

## 2021-12-08 DIAGNOSIS — M18.12 OSTEOARTHRITIS OF LEFT THUMB: ICD-10-CM

## 2021-12-08 DIAGNOSIS — M54.50 LUMBAR SPINE PAIN: ICD-10-CM

## 2021-12-08 PROCEDURE — 99203 OFFICE O/P NEW LOW 30 MIN: CPT | Performed by: STUDENT IN AN ORGANIZED HEALTH CARE EDUCATION/TRAINING PROGRAM

## 2021-12-08 RX ORDER — ASPIRIN 325 MG
325 TABLET, DELAYED RELEASE (ENTERIC COATED) ORAL EVERY 6 HOURS PRN
COMMUNITY
End: 2023-03-13

## 2021-12-08 RX ORDER — SENNOSIDES 8.6 MG
CAPSULE ORAL
COMMUNITY
Start: 2021-11-12

## 2021-12-08 RX ORDER — GABAPENTIN 600 MG/1
TABLET ORAL
COMMUNITY
Start: 2021-10-18

## 2021-12-08 RX ORDER — IBUPROFEN 800 MG/1
TABLET ORAL
COMMUNITY
Start: 2021-11-26 | End: 2021-12-08 | Stop reason: ALTCHOICE

## 2021-12-08 RX ORDER — BRIMONIDINE TARTRATE/TIMOLOL 0.2%-0.5%
DROPS OPHTHALMIC (EYE)
COMMUNITY
Start: 2021-11-18 | End: 2023-03-13

## 2021-12-08 NOTE — PROGRESS NOTES
"Chief Complaint  Pain of the Right Hip    Subjective          Abelino Canales presents to [facility] for   History of Present Illness    The patinet presents here today for evaluation of the right hip. The patient reports moderate to severe pain to the right hip. He reports pain with standing and at night. He states it has been ongoing to for about 1 year. He reports pain to the posterior hip that radiates to the thigh. He has taken ibuprofen for the pain. He has no injury or trauma to the right hip. He also reports left thumb pain today with no injury.  He has no other complaints.     No Known Allergies     Social History     Socioeconomic History   • Marital status:    Tobacco Use   • Smoking status: Never Smoker   • Smokeless tobacco: Never Used        I reviewed the patient's chief complaint, history of present illness, review of systems, past medical history, surgical history, family history, social history, medications, and allergy list.     REVIEW OF SYSTEMS    Constitutional: Denies fevers, chills, weight loss  Cardiovascular: Denies chest pain, shortness of breath  Skin: Denies rashes, acute skin changes  Neurologic: Denies headache, loss of consciousness  MSK: Right hip pain      Objective   Vital Signs:   Pulse 70   Ht 193 cm (76\")   Wt 114 kg (251 lb 9.6 oz)   SpO2 99%   BMI 30.63 kg/m²     Body mass index is 30.63 kg/m².    Physical Exam    Right hip- non-tender to greater trochanter, SI joint or lumbar spine. Neurovascularly intact. No pain with flexion. Flexion 90. Internal rotation 30. No pain with impingement testing. No pain with Hilda. External Rotation 45. 5/5 hip adduction. Intact extensor mechanism. Positive EHL, FHL, GS and TA. Sensation intact to all 5 nerves of the foot. Positive pulses. \    Left thumb- tender and pain with CMC grind test. Negative finkelstein test. Sensation to light touch median, radial, ulnar nerve. Positive AIN, PIN, ulnar nerve. " positive pulses. Full finger ROM. Can make a full fist.     Procedures    Imaging Results (Most Recent)     Procedure Component Value Units Date/Time    XR Hip With or Without Pelvis 2 - 3 View Right [[id number]] Resulted: 12/08/21 1118     Updated: 12/08/21 1119    Narrative:      Indications: Right hip pain    Views: AP and frog lateral right hip    Findings: Cam deformity and pincer lesion seen on the right hip.  Hip is   reduced.  No advanced arthritic changes noted.  No fracture seen.    Comparative Data: No comparative data available                   Assessment and Plan    Diagnoses and all orders for this visit:    1. Right hip pain (Primary)  -     XR Hip With or Without Pelvis 2 - 3 View Right  -     Ambulatory Referral to Physical Therapy Evaluate and treat, Ortho; Stretching, ROM, Strengthening; Full weight bearing  -     diclofenac (VOLTAREN) 50 MG EC tablet; Take 1 tablet by mouth 2 (Two) Times a Day.  Dispense: 60 tablet; Refill: 1    2. Lumbar spine pain  -     Ambulatory Referral to Physical Therapy Evaluate and treat, Ortho; Stretching, ROM, Strengthening; Full weight bearing  -     diclofenac (VOLTAREN) 50 MG EC tablet; Take 1 tablet by mouth 2 (Two) Times a Day.  Dispense: 60 tablet; Refill: 1    3. Osteoarthritis of left thumb  -     diclofenac (VOLTAREN) 50 MG EC tablet; Take 1 tablet by mouth 2 (Two) Times a Day.  Dispense: 60 tablet; Refill: 1        Discussed the treatment plan with the patient.  I reviewed the x-rays that were obtained today with the patient. Order for physical therapy given today. Prescription for Voltaren given today. Plan to continue conservative treatment at this time. I think some of his pain is coming from his back and he has some muscle pain, he will be treated at physical therapy for his back as well.     Call or return if symptoms worsen or patient has any concerns.       Scribed for Ayaz Oakes MD by Ayaz Oakes MD  12/08/2021   11:10 EST         Follow Up    Return in about 4 weeks (around 1/5/2022).  Patient was given instructions and counseling regarding his condition or for health maintenance advice. Please see specific information pulled into the AVS if appropriate.       I have personally performed the services described in this document as scribed by the above individual and it is both accurate and complete.     Ayaz Oakes MD  12/10/21  08:28 EST

## 2023-03-12 PROCEDURE — 99284 EMERGENCY DEPT VISIT MOD MDM: CPT

## 2023-03-13 ENCOUNTER — APPOINTMENT (OUTPATIENT)
Dept: GENERAL RADIOLOGY | Facility: HOSPITAL | Age: 58
End: 2023-03-13
Payer: COMMERCIAL

## 2023-03-13 ENCOUNTER — HOSPITAL ENCOUNTER (EMERGENCY)
Facility: HOSPITAL | Age: 58
Discharge: HOME | End: 2023-03-13
Attending: EMERGENCY MEDICINE | Admitting: EMERGENCY MEDICINE
Payer: COMMERCIAL

## 2023-03-13 VITALS
HEIGHT: 76 IN | DIASTOLIC BLOOD PRESSURE: 68 MMHG | OXYGEN SATURATION: 97 % | SYSTOLIC BLOOD PRESSURE: 150 MMHG | RESPIRATION RATE: 18 BRPM | BODY MASS INDEX: 30.85 KG/M2 | WEIGHT: 253.31 LBS | TEMPERATURE: 98.3 F | HEART RATE: 75 BPM

## 2023-03-13 DIAGNOSIS — J18.9 PNEUMONIA DUE TO INFECTIOUS ORGANISM, UNSPECIFIED LATERALITY, UNSPECIFIED PART OF LUNG: Primary | ICD-10-CM

## 2023-03-13 LAB
ALBUMIN SERPL-MCNC: 4.2 G/DL (ref 3.5–5.2)
ALBUMIN/GLOB SERPL: 1.4 G/DL
ALP SERPL-CCNC: 89 U/L (ref 39–117)
ALT SERPL W P-5'-P-CCNC: 26 U/L (ref 1–41)
ANION GAP SERPL CALCULATED.3IONS-SCNC: 10.1 MMOL/L (ref 5–15)
AST SERPL-CCNC: 33 U/L (ref 1–40)
BASOPHILS # BLD AUTO: 0.03 10*3/MM3 (ref 0–0.2)
BASOPHILS NFR BLD AUTO: 0.3 % (ref 0–1.5)
BILIRUB SERPL-MCNC: 0.3 MG/DL (ref 0–1.2)
BUN SERPL-MCNC: 11 MG/DL (ref 6–20)
BUN/CREAT SERPL: 12.1 (ref 7–25)
CALCIUM SPEC-SCNC: 9.1 MG/DL (ref 8.6–10.5)
CHLORIDE SERPL-SCNC: 104 MMOL/L (ref 98–107)
CO2 SERPL-SCNC: 23.9 MMOL/L (ref 22–29)
CREAT SERPL-MCNC: 0.91 MG/DL (ref 0.76–1.27)
DEPRECATED RDW RBC AUTO: 51.2 FL (ref 37–54)
EGFRCR SERPLBLD CKD-EPI 2021: 97.7 ML/MIN/1.73
EOSINOPHIL # BLD AUTO: 0.17 10*3/MM3 (ref 0–0.4)
EOSINOPHIL NFR BLD AUTO: 1.7 % (ref 0.3–6.2)
ERYTHROCYTE [DISTWIDTH] IN BLOOD BY AUTOMATED COUNT: 16.6 % (ref 12.3–15.4)
GEN 5 2HR TROPONIN T REFLEX: 7 NG/L
GLOBULIN UR ELPH-MCNC: 3 GM/DL
GLUCOSE SERPL-MCNC: 103 MG/DL (ref 65–99)
HCT VFR BLD AUTO: 35.6 % (ref 37.5–51)
HGB BLD-MCNC: 11.5 G/DL (ref 13–17.7)
HOLD SPECIMEN: NORMAL
HOLD SPECIMEN: NORMAL
IMM GRANULOCYTES # BLD AUTO: 0.04 10*3/MM3 (ref 0–0.05)
IMM GRANULOCYTES NFR BLD AUTO: 0.4 % (ref 0–0.5)
LIPASE SERPL-CCNC: 33 U/L (ref 13–60)
LYMPHOCYTES # BLD AUTO: 1.03 10*3/MM3 (ref 0.7–3.1)
LYMPHOCYTES NFR BLD AUTO: 10.5 % (ref 19.6–45.3)
MAGNESIUM SERPL-MCNC: 1.9 MG/DL (ref 1.6–2.6)
MCH RBC QN AUTO: 27.2 PG (ref 26.6–33)
MCHC RBC AUTO-ENTMCNC: 32.3 G/DL (ref 31.5–35.7)
MCV RBC AUTO: 84.2 FL (ref 79–97)
MONOCYTES # BLD AUTO: 0.7 10*3/MM3 (ref 0.1–0.9)
MONOCYTES NFR BLD AUTO: 7.1 % (ref 5–12)
NEUTROPHILS NFR BLD AUTO: 7.86 10*3/MM3 (ref 1.7–7)
NEUTROPHILS NFR BLD AUTO: 80 % (ref 42.7–76)
NRBC BLD AUTO-RTO: 0 /100 WBC (ref 0–0.2)
NT-PROBNP SERPL-MCNC: <36 PG/ML (ref 0–900)
PLATELET # BLD AUTO: 251 10*3/MM3 (ref 140–450)
PMV BLD AUTO: 9.9 FL (ref 6–12)
POTASSIUM SERPL-SCNC: 4.1 MMOL/L (ref 3.5–5.2)
PROT SERPL-MCNC: 7.2 G/DL (ref 6–8.5)
QT INTERVAL: 373 MS
QT INTERVAL: 385 MS
RBC # BLD AUTO: 4.23 10*6/MM3 (ref 4.14–5.8)
SODIUM SERPL-SCNC: 138 MMOL/L (ref 136–145)
TROPONIN T DELTA: 0 NG/L
TROPONIN T SERPL HS-MCNC: 7 NG/L
WBC NRBC COR # BLD: 9.83 10*3/MM3 (ref 3.4–10.8)
WHOLE BLOOD HOLD COAG: NORMAL
WHOLE BLOOD HOLD SPECIMEN: NORMAL

## 2023-03-13 PROCEDURE — 80053 COMPREHEN METABOLIC PANEL: CPT

## 2023-03-13 PROCEDURE — 83690 ASSAY OF LIPASE: CPT

## 2023-03-13 PROCEDURE — 93005 ELECTROCARDIOGRAM TRACING: CPT

## 2023-03-13 PROCEDURE — 83880 ASSAY OF NATRIURETIC PEPTIDE: CPT

## 2023-03-13 PROCEDURE — 71045 X-RAY EXAM CHEST 1 VIEW: CPT

## 2023-03-13 PROCEDURE — 36415 COLL VENOUS BLD VENIPUNCTURE: CPT

## 2023-03-13 PROCEDURE — 83735 ASSAY OF MAGNESIUM: CPT

## 2023-03-13 PROCEDURE — 93005 ELECTROCARDIOGRAM TRACING: CPT | Performed by: EMERGENCY MEDICINE

## 2023-03-13 PROCEDURE — 84484 ASSAY OF TROPONIN QUANT: CPT

## 2023-03-13 PROCEDURE — 85025 COMPLETE CBC W/AUTO DIFF WBC: CPT

## 2023-03-13 RX ORDER — SODIUM CHLORIDE 0.9 % (FLUSH) 0.9 %
10 SYRINGE (ML) INJECTION AS NEEDED
Status: DISCONTINUED | OUTPATIENT
Start: 2023-03-13 | End: 2023-03-13 | Stop reason: HOSPADM

## 2023-03-13 RX ORDER — DOXYCYCLINE 100 MG/1
100 CAPSULE ORAL 2 TIMES DAILY
Qty: 14 CAPSULE | Refills: 0 | Status: SHIPPED | OUTPATIENT
Start: 2023-03-13 | End: 2023-03-20

## 2023-03-13 RX ORDER — ASPIRIN 81 MG/1
324 TABLET, CHEWABLE ORAL ONCE
Status: COMPLETED | OUTPATIENT
Start: 2023-03-13 | End: 2023-03-13

## 2023-03-13 RX ADMIN — ASPIRIN 81 MG 324 MG: 81 TABLET ORAL at 04:23

## 2023-03-13 NOTE — ED PROVIDER NOTES
Time: 5:01 AM EDT  Date of encounter:  3/12/2023  Independent Historian/Clinical History and Information was obtained by:   Patient  Chief Complaint: chest pain    History is limited by: N/A    History of Present Illness:  Patient is a 57 y.o. year old male who presents to the emergency department for evaluation of Chest pain.  Chest pain started yesterday.  Pain is since resolved.  Patient reports that he has had some congestion and a mild nonproductive cough.  He denies any fever or chills.  No nausea, vomiting, diarrhea.  No alleviating aggravating factors.    HPI    Patient Care Team  Primary Care Provider: Rudolph Allison MD    Past Medical History:     No Known Allergies  Past Medical History:   Diagnosis Date   • Arthritis    • Atrial fibrillation (HCC) 2000    WENT INTO A-FIB FOLLOWING HERNIA SURGERY   • Decreased ROM of left shoulder     FROZEN LEFT SHOULDER   • Glaucoma    • Limb pain    • Limb swelling      Past Surgical History:   Procedure Laterality Date   • ABDOMINAL SURGERY      UMBILICAL HERNIA REPAIRED X 2   • CARPAL TUNNEL RELEASE WITH CUBITAL TUNNEL RELEASE      FROM SHOULDER TO ULNA   • COLONOSCOPY      Rainer-2015-colon polyps   • COLONOSCOPY N/A 6/24/2021    Procedure: COLONOSCOPY;  Surgeon: Rudolph Spear MD;  Location: [facility] ENDOSCOPY;  Service: Gastroenterology;  Laterality: N/A;  normal   • EYE SURGERY      GLAUCOMA SURGERY   • EYE SURGERY      RIGHT EYE CATARACT REMOVED   • HERNIA REPAIR     • SHOULDER ARTHROSCOPY       Family History   Problem Relation Age of Onset   • Cancer Father    • Diabetes Father        Home Medications:  Prior to Admission medications    Medication Sig Start Date End Date Taking? Authorizing Provider   Brinzolamide (AZOPT OP) Apply 1 drop to eye(s) as directed by provider 3 (Three) Times a Day. LEFT EYE ONLY   Yes Beckie Hillman MD   flecainide (TAMBOCOR) 100 MG tablet Take 1 tablet by mouth 2 (Two) Times a Day.   Yes Beckie Hillman MD    gabapentin (NEURONTIN) 600 MG tablet  10/18/21  Yes Beckie Hillman MD   acetaminophen (TYLENOL) 650 MG 8 hr tablet  11/12/21   Beckie Hillman MD   diclofenac (VOLTAREN) 50 MG EC tablet Take 1 tablet by mouth 2 (Two) Times a Day. 12/8/21   Ayaz Oakes MD   aspirin  MG tablet Take 325 mg by mouth Every 6 (Six) Hours As Needed.  3/13/23  Beckie Hillman MD   Brimonidine Tartrate-Timolol (COMBIGAN OP) Apply 1 drop to eye(s) as directed by provider 2 (two) times a day. LEFT EYE ONLY  3/13/23  Beckie Hillman MD   Combigan 0.2-0.5 % ophthalmic solution  11/18/21 3/13/23  Beckie Hillman MD   HYDROcodone-acetaminophen (NORCO) 5-325 MG per tablet Take 1 tablet by mouth Every 8 (Eight) Hours As Needed for Moderate Pain .  3/13/23  Beckie Hillman MD   Latanoprostene Bunod (VYZULTA OP) Apply 1 drop to eye(s) as directed by provider Every Night. LEFT EYE ONLY  3/13/23  Beckie Hillman MD   rivaroxaban (Xarelto) 20 MG tablet Take 20 mg by mouth Daily. LAST DOSE June 9TH DUE  TUBA TUNNEL RELEASE SURGERY ON 6/15 STAYING OFF TIL AFTER COLONOSCOPY  3/13/23  Beckie Hillman MD        Social History:   Social History     Tobacco Use   • Smoking status: Never   • Smokeless tobacco: Never   Vaping Use   • Vaping Use: Never used   Substance Use Topics   • Alcohol use: Yes     Comment: OCCASSIONALLY   • Drug use: Never         Review of Systems:  Review of Systems   Constitutional: Negative for chills and fever.   HENT: Negative for congestion, ear pain and sore throat.    Eyes: Negative for pain.   Respiratory: Negative for cough, chest tightness and shortness of breath.    Cardiovascular: Negative for chest pain.   Gastrointestinal: Negative for abdominal pain, diarrhea, nausea and vomiting.   Genitourinary: Negative for flank pain and hematuria.   Musculoskeletal: Negative for joint swelling.   Skin: Negative for pallor.   Neurological: Negative for seizures and headaches.  "  All other systems reviewed and are negative.       Physical Exam:  /68 (BP Location: Left arm, Patient Position: Sitting)   Pulse 75   Temp 98.3 °F (36.8 °C) (Oral)   Resp 18   Ht 193 cm (76\")   Wt 115 kg (253 lb 4.9 oz)   SpO2 97%   BMI 30.83 kg/m²     Physical Exam  Constitutional:       Appearance: Normal appearance.   HENT:      Head: Normocephalic and atraumatic.      Nose: Nose normal.      Mouth/Throat:      Mouth: Mucous membranes are moist.   Eyes:      Extraocular Movements: Extraocular movements intact.      Conjunctiva/sclera: Conjunctivae normal.      Pupils: Pupils are equal, round, and reactive to light.   Cardiovascular:      Rate and Rhythm: Normal rate and regular rhythm.      Pulses: Normal pulses.      Heart sounds: Normal heart sounds.   Pulmonary:      Effort: Pulmonary effort is normal.      Breath sounds: Normal breath sounds.   Abdominal:      General: There is no distension.      Palpations: Abdomen is soft.      Tenderness: There is no abdominal tenderness.   Musculoskeletal:         General: Normal range of motion.      Cervical back: Normal range of motion.   Skin:     General: Skin is warm and dry.      Capillary Refill: Capillary refill takes less than 2 seconds.   Neurological:      General: No focal deficit present.      Mental Status: He is alert and oriented to person, place, and time. Mental status is at baseline.   Psychiatric:         Mood and Affect: Mood normal.         Behavior: Behavior normal.                  Procedures:  Procedures      Medical Decision Making:      Comorbidities that affect care:    Atrial Fibrillation    External Notes reviewed:    Previous Clinic Note: patient seen by ortho on 12/8/21 for hip and spine pain      The following orders were placed and all results were independently analyzed by me:  Orders Placed This Encounter   Procedures   • XR Chest 1 View   • [place] Draw   • High Sensitivity Troponin T   • Comprehensive Metabolic Panel "   • Lipase   • BNP   • Magnesium   • CBC Auto Differential   • High Sensitivity Troponin T 2Hr   • NPO Diet NPO Type: Strict NPO   • Undress & Gown   • Cardiac Monitoring   • Continuous Pulse Oximetry   • Oxygen Therapy- Nasal Cannula; 2 LPM; Titrate for SPO2: equal to or greater than, 92%   • ECG 12 Lead ED Triage Standing Order; Chest Pain   • ECG 12 Lead ED Triage Standing Order; Chest Pain   • Insert Peripheral IV   • CBC & Differential   • Green Top (Gel)   • Lavender Top   • Gold Top - SST   • Light Blue Top       Medications Given in the Emergency Department:  Medications   sodium chloride 0.9 % flush 10 mL (has no administration in time range)   aspirin chewable tablet 324 mg (324 mg Oral Given 3/13/23 0423)        ED Course:    ED Course as of 03/13/23 0508   Mon Mar 13, 2023   0503 ECG 12 Lead ED Triage Standing Order; Chest Pain  Sinus rhythm with rate of 68.  Prolonged NE interval.  Normal QTc.  No acute ST elevation.  Nonspecific T wave abnormality.  No significant change when compared to previous.  EKG interpreted by me. [LD]   0504 ECG 12 Lead ED Triage Standing Order; Chest Pain  Sinus rhythm with rate of 75.  Prolonged NE interval.  Normal QTc.  No acute ST elevation.  Nonspecific T wave abnormality.  EKG interpreted by me.  No significant change when compared to previous [LD]      ED Course User Index  [LD] Lory Calzada MD       Labs:    Lab Results (last 24 hours)     Procedure Component Value Units Date/Time    High Sensitivity Troponin T [[id number]]  (Normal) Collected: 03/13/23 0023    Specimen: Blood Updated: 03/13/23 0104     HS Troponin T 7 ng/L     Narrative:      High Sensitive Troponin T Reference Range:  <10.0 ng/L- Negative Female for AMI  <15.0 ng/L- Negative Male for AMI  >=10 - Abnormal Female indicating possible myocardial injury.  >=15 - Abnormal Male indicating possible myocardial injury.   Clinicians would have to utilize clinical acumen, EKG, Troponin, and serial changes  to determine if it is an Acute Myocardial Infarction or myocardial injury due to an underlying chronic condition.         CBC & Differential [[id number]]  (Abnormal) Collected: 03/13/23 0023    Specimen: Blood Updated: 03/13/23 0040    Narrative:      The following orders were created for panel order CBC & Differential.  Procedure                               Abnormality         Status                     ---------                               -----------         ------                     CBC Auto Differential[[id number]]        Abnormal            Final result                 Please view results for these tests on the individual orders.    Comprehensive Metabolic Panel [[id number]]  (Abnormal) Collected: 03/13/23 0023    Specimen: Blood Updated: 03/13/23 0104     Glucose 103 mg/dL      BUN 11 mg/dL      Creatinine 0.91 mg/dL      Sodium 138 mmol/L      Potassium 4.1 mmol/L      Chloride 104 mmol/L      CO2 23.9 mmol/L      Calcium 9.1 mg/dL      Total Protein 7.2 g/dL      Albumin 4.2 g/dL      ALT (SGPT) 26 U/L      AST (SGOT) 33 U/L      Alkaline Phosphatase 89 U/L      Total Bilirubin 0.3 mg/dL      Globulin 3.0 gm/dL      A/G Ratio 1.4 g/dL      BUN/Creatinine Ratio 12.1     Anion Gap 10.1 mmol/L      eGFR 97.7 mL/min/1.73     Narrative:      GFR Normal >60  Chronic Kidney Disease <60  Kidney Failure <15      Lipase [[id number]]  (Normal) Collected: 03/13/23 0023    Specimen: Blood Updated: 03/13/23 0104     Lipase 33 U/L     BNP [[id number]]  (Normal) Collected: 03/13/23 0023    Specimen: Blood Updated: 03/13/23 0101     proBNP <36.0 pg/mL     Narrative:      Among patients with dyspnea, NT-proBNP is highly sensitive for the detection of acute congestive heart failure. In addition NT-proBNP of <300 pg/ml effectively rules out acute congestive heart failure with 99% negative predictive value.      Magnesium [[id number]]  (Normal) Collected: 03/13/23 0023    Specimen: Blood Updated: 03/13/23 0104     Magnesium 1.9  mg/dL     CBC Auto Differential [[id number]]  (Abnormal) Collected: 03/13/23 0023    Specimen: Blood Updated: 03/13/23 0040     WBC 9.83 10*3/mm3      RBC 4.23 10*6/mm3      Hemoglobin 11.5 g/dL      Hematocrit 35.6 %      MCV 84.2 fL      MCH 27.2 pg      MCHC 32.3 g/dL      RDW 16.6 %      RDW-SD 51.2 fl      MPV 9.9 fL      Platelets 251 10*3/mm3      Neutrophil % 80.0 %      Lymphocyte % 10.5 %      Monocyte % 7.1 %      Eosinophil % 1.7 %      Basophil % 0.3 %      Immature Grans % 0.4 %      Neutrophils, Absolute 7.86 10*3/mm3      Lymphocytes, Absolute 1.03 10*3/mm3      Monocytes, Absolute 0.70 10*3/mm3      Eosinophils, Absolute 0.17 10*3/mm3      Basophils, Absolute 0.03 10*3/mm3      Immature Grans, Absolute 0.04 10*3/mm3      nRBC 0.0 /100 WBC     High Sensitivity Troponin T 2Hr [[id number]]  (Normal) Collected: 03/13/23 0340    Specimen: Blood Updated: 03/13/23 0403     HS Troponin T 7 ng/L      Troponin T Delta 0 ng/L     Narrative:      High Sensitive Troponin T Reference Range:  <10.0 ng/L- Negative Female for AMI  <15.0 ng/L- Negative Male for AMI  >=10 - Abnormal Female indicating possible myocardial injury.  >=15 - Abnormal Male indicating possible myocardial injury.   Clinicians would have to utilize clinical acumen, EKG, Troponin, and serial changes to determine if it is an Acute Myocardial Infarction or myocardial injury due to an underlying chronic condition.                Imaging:    XR Chest 1 View    Result Date: 3/13/2023  PROCEDURE: XR CHEST 1 VW  COMPARISON: 5/3/2019.  INDICATIONS: CHEST PAIN.  FINDINGS:  A single AP (or PA) upright portable chest radiograph was performed.  No cardiac enlargement is seen.  A tiny left pleural effusion is possible.  No definite right pleural effusion.  No pneumothorax is identified.  Chronic calcified granulomatous disease involves the chest.  There is slight asymmetric elevation of the left diaphragm, likely chronic.  There may be mild atelectasis and/or  fibrosis in the lung bases.  Pneumonia, especially in the right lung base, cannot be excluded.  There is slight motion artifact on the exam.  Otherwise, no significant interval change is seen since the prior study (or studies).        Possible new right basilar pneumonia.     Please note that portions of this note were completed with a voice recognition program.  MERLE PHILLIPS JR, MD       Electronically Signed and Approved By: MERLE PHILLIPS JR, MD on 3/13/2023 at 1:41                  Differential Diagnosis and Discussion:    Chest Pain:  Based on the patient's signs and symptoms, I considered aortic dissection, myocardial infaction, pulmonary embolism, cardiac tamponade, pericarditis, pneumothorax, musculoskeletal chest pain and other differential diagnosis as an etiology of the patient's chest pain.     All labs were reviewed and interpreted by me.  All X-rays were independently reviewed by me.  EKG was interpreted by me.    MDM  Number of Diagnoses or Management Options  Diagnosis management comments:   Patient is afebrile nontoxic-appearing.  Vital signs are stable.  At time of evaluation he is lying in bed in no acute distress.  No chest pain at this time. Labs show no significant abnormality.  EKG shows sinus rhythm no acute ST elevation.  Troponin was negative.  Chest x-ray concerning for possible pneumonia.  Will treat for pneumonia.  Recommend follow-up with his primary physician.  Discussed return precautions, discharge instructions and answered all his questions.       Amount and/or Complexity of Data Reviewed  Clinical lab tests: reviewed  Tests in the radiology section of CPT®: reviewed  Review and summarize past medical records: yes  Independent visualization of images, tracings, or specimens: yes    Risk of Complications, Morbidity, and/or Mortality  Presenting problems: low  Management options: low    Patient Progress  Patient progress: stable           Patient Care Considerations:    CT CHEST: I  considered ordering a CT scan of the chest, however no shortness of breath at this time      Consultants/Shared Management Plan:    None    Social Determinants of Health:    Patient is independent, reliable, and has access to care.       Disposition and Care Coordination:    Discharged: I considered escalation of care by admitting this patient for observation, however the patient has improved and is suitable and  stable for discharge.    I have explained the patient´s condition, diagnoses and treatment plan based on the information available to me at this time. I have answered questions and addressed any concerns. The patient has a good  understanding of the patient´s diagnosis, condition, and treatment plan as can be expected at this point. The vital signs have been stable. The patient´s condition is stable and appropriate for discharge from the emergency department.      The patient will pursue further outpatient evaluation with the primary care physician or other designated or consulting physician as outlined in the discharge instructions. They are agreeable to this plan of care and follow-up instructions have been explained in detail. The patient has received these instructions in written format and have expressed an understanding of the discharge instructions. The patient is aware that any significant change in condition or worsening of symptoms should prompt an immediate return to this or the closest emergency department or call to 911.  I have explained discharge medications and the need for follow up with the patient/caretakers. This was also printed in the discharge instructions. Patient was discharged with the following medications and follow up:      Medication List      New Prescriptions    doxycycline 100 MG capsule  Commonly known as: MONODOX  Take 1 capsule by mouth 2 (Two) Times a Day for 7 days.           Where to Get Your Medications      These medications were sent to [id number] -  [place], [place] - [address] - [phone]  - [phone]  [address], [place] [address]    Phone: [phone]   · doxycycline 100 MG capsule      Rudolph Allison MD  [address] [place] [address]  [phone]    In 2 days         Final diagnoses:   Pneumonia due to infectious organism, unspecified laterality, unspecified part of lung        ED Disposition     ED Disposition   Discharge    Condition   Stable    Comment   --             This medical record created using voice recognition software.             Lory Calzada MD  03/13/23 9712

## 2023-03-13 NOTE — ED TRIAGE NOTES
Pt to ED from home with reports of upper abdominal pain that radiates up into right chest/rib cage x3-4 hours.      Pt states he was cleaning out bathtub when pain began.      Pt denies all other symptoms.     Detail Level: Generalized Detail Level: Detailed Clear bilaterally, pupils equal, round and reactive to light.

## 2023-04-19 ENCOUNTER — OFFICE VISIT (OUTPATIENT)
Dept: ORTHOPEDIC SURGERY | Facility: CLINIC | Age: 58
End: 2023-04-19
Payer: COMMERCIAL

## 2023-04-19 VITALS — WEIGHT: 250 LBS | HEART RATE: 69 BPM | BODY MASS INDEX: 30.44 KG/M2 | HEIGHT: 76 IN | OXYGEN SATURATION: 97 %

## 2023-04-19 DIAGNOSIS — M25.512 LEFT SHOULDER PAIN, UNSPECIFIED CHRONICITY: Primary | ICD-10-CM

## 2023-04-19 DIAGNOSIS — M19.012 PRIMARY OSTEOARTHRITIS OF LEFT SHOULDER: ICD-10-CM

## 2023-04-19 RX ORDER — DORZOLAMIDE HCL 20 MG/ML
SOLUTION/ DROPS OPHTHALMIC
COMMUNITY

## 2023-04-19 RX ORDER — TIZANIDINE 2 MG/1
TABLET ORAL
COMMUNITY

## 2023-04-19 RX ORDER — IBUPROFEN 600 MG/1
1 TABLET ORAL EVERY 12 HOURS SCHEDULED
COMMUNITY
Start: 2023-03-15

## 2023-04-19 NOTE — PROGRESS NOTES
"Chief Complaint  Initial Evaluation and Pain of the Left Shoulder    Subjective          Abelino Canales presents to [facility] for   History of Present Illness    The patient presents here today for evaluation of the left shoulder. The patient reports in 2016 he had a frozen shoulder and was treated operatively in [place] and had therapy. He reports he still has tightness in his shoulder. He denies injury or trauma.       No Known Allergies     Social History     Socioeconomic History   • Marital status:    Tobacco Use   • Smoking status: Never   • Smokeless tobacco: Never   Vaping Use   • Vaping Use: Never used   Substance and Sexual Activity   • Alcohol use: Yes     Comment: OCCASSIONALLY   • Drug use: Never   • Sexual activity: Defer        I reviewed the patient's chief complaint, history of present illness, review of systems, past medical history, surgical history, family history, social history, medications, and allergy list.     REVIEW OF SYSTEMS    Constitutional: Denies fevers, chills, weight loss  Cardiovascular: Denies chest pain, shortness of breath  Skin: Denies rashes, acute skin changes  Neurologic: Denies headache, loss of consciousness  MSK: Left shoulder pain      Objective   Vital Signs:   Pulse 69   Ht 193 cm (76\")   Wt 113 kg (250 lb)   SpO2 97%   BMI 30.43 kg/m²     Body mass index is 30.43 kg/m².    Physical Exam    General: Alert. No acute distress.   Left shoulder- Sensation to light touch median, radial, ulnar nerve. Positive AIN, PIN, ulnar nerve motor function. Positive pulses. Forward elevation 90, passive 120 degrees. External Rotation 30. Internal rotation lower lumbar spine. 5/5 rotator cuff strength. Mild pain with impingement. Negative speeds testing. Negative [place]. Full finger ROM.     Procedures    Imaging Results (Most Recent)     Procedure Component Value Units Date/Time    XR Shoulder 2+ View Left [[id number]] Resulted: 04/19/23 1125 "     Updated: 04/19/23 1126    Narrative:      Indications: Left shoulder pain    Views: AP, Grashey, Scap Y, axillary left shoulder    Findings: Moderate arthritic changes seen in the glenohumeral and AC   joints.  No fractures.  Glenohumeral joint reduced.    Comparative Data: No comparative data available                     Assessment and Plan        XR Shoulder 2+ View Left    Result Date: 4/19/2023  Narrative: Indications: Left shoulder pain Views: AP, Grashey, Scap Y, axillary left shoulder Findings: Moderate arthritic changes seen in the glenohumeral and AC joints.  No fractures.  Glenohumeral joint reduced. Comparative Data: No comparative data available        Diagnoses and all orders for this visit:    1. Left shoulder pain, unspecified chronicity (Primary)  -     XR Shoulder 2+ View Left  -     diclofenac (VOLTAREN) 50 MG EC tablet; Take 1 tablet by mouth 2 (Two) Times a Day.  Dispense: 60 tablet; Refill: 1    2. Primary osteoarthritis of left shoulder  -     diclofenac (VOLTAREN) 50 MG EC tablet; Take 1 tablet by mouth 2 (Two) Times a Day.  Dispense: 60 tablet; Refill: 1        Discussed the treatment plan with the patient.  I reviewed the x-rays that were obtained today with the patient. Plan for conservative treatment at this time. Prescription for diclofenac given today. Home exercises given today. May consider a steroid injection if symptoms persists.     Call or return if worsening symptoms.    Scribed for Ayaz Oakes MD by Bronwyn Cabezas  04/19/2023   09:16 EDT         Follow Up       6 weeks    Patient was given instructions and counseling regarding his condition or for health maintenance advice. Please see specific information pulled into the AVS if appropriate.       I have personally performed the services described in this document as scribed by the above individual and it is both accurate and complete.     Ayaz Oakes MD  04/19/23  12:56 EDT

## 2023-05-01 ENCOUNTER — TELEPHONE (OUTPATIENT)
Dept: ORTHOPEDIC SURGERY | Facility: CLINIC | Age: 58
End: 2023-05-01
Payer: COMMERCIAL

## 2023-05-01 DIAGNOSIS — M25.512 LEFT SHOULDER PAIN, UNSPECIFIED CHRONICITY: Primary | ICD-10-CM

## 2023-05-01 RX ORDER — IBUPROFEN 800 MG/1
800 TABLET ORAL 3 TIMES DAILY
Qty: 90 TABLET | Refills: 5 | Status: SHIPPED | OUTPATIENT
Start: 2023-05-01

## 2023-05-01 NOTE — TELEPHONE ENCOUNTER
Caller: MARY   Relationship to Patient: SELF   Phone Number: [phone]   Reason for Call: PATIENT CALLING ASKING FOR IBUPROFEN 800 BECAUSE THE OTHER MEDICATION DOESN'T HELP

## 2023-11-02 ENCOUNTER — CONSULT (OUTPATIENT)
Dept: ONCOLOGY | Facility: HOSPITAL | Age: 58
End: 2023-11-02
Payer: COMMERCIAL

## 2023-11-02 VITALS
DIASTOLIC BLOOD PRESSURE: 76 MMHG | OXYGEN SATURATION: 94 % | TEMPERATURE: 97.8 F | BODY MASS INDEX: 29.67 KG/M2 | RESPIRATION RATE: 16 BRPM | HEIGHT: 76 IN | SYSTOLIC BLOOD PRESSURE: 134 MMHG | WEIGHT: 243.61 LBS | HEART RATE: 61 BPM

## 2023-11-02 DIAGNOSIS — D64.9 CHRONIC ANEMIA: Primary | ICD-10-CM

## 2023-11-02 PROBLEM — M19.90 ARTHRITIS: Status: ACTIVE | Noted: 2023-11-02

## 2023-11-02 PROBLEM — I48.91 ATRIAL FIBRILLATION: Status: ACTIVE | Noted: 2023-11-02

## 2023-11-02 PROBLEM — M77.8 TENDINITIS OF RIGHT WRIST: Status: ACTIVE | Noted: 2022-10-13

## 2023-11-02 PROBLEM — M79.609 LIMB PAIN: Status: ACTIVE | Noted: 2023-11-02

## 2023-11-02 PROBLEM — M79.89 LIMB SWELLING: Status: ACTIVE | Noted: 2023-11-02

## 2023-11-02 PROCEDURE — G0463 HOSPITAL OUTPT CLINIC VISIT: HCPCS | Performed by: NURSE PRACTITIONER

## 2023-11-02 RX ORDER — TRAMADOL HYDROCHLORIDE 50 MG/1
1 TABLET ORAL EVERY 8 HOURS PRN
COMMUNITY

## 2023-11-02 RX ORDER — DOXYCYCLINE 100 MG/1
CAPSULE ORAL
COMMUNITY

## 2023-11-02 NOTE — PROGRESS NOTES
Chief Complaint/Reason for Referral:  Anemia    Rudolph Allison MD Ahmed, Syed Zulfiqar, MD    Records Obtained:  Records of the patients history including those obtained from  [facility] and patient information  were reviewed and summarized in detail.    Subjective    History of present illness:     Mr. Abelino Canales is a pleasant 58 year old  male presenting for new patient evaluation for chronic anemia as referral from his PCP: Dr. Allison. Reports he follows with cardiology and received a good report from them recently. PMH includes: atrial fibrillation, chronic back pain and hip pain, tobacco use of Denies any GI blood loss, change in bowel habits, difficulty swallowing, N/V/D or constipation. Denies any recent weight loss. Reports he has had chronic anemia for many years. Family history of a father with leukemia but does not know what type and passed away many years ago. No other family history he is aware of. Has chronic back pain, hip pain. No history of tobacco use, rare use of alcohol.     Labs show  normocytic anemia dating back to at least 7/2021 in the range of 10.6 to 11.8, but patient does endorse he has had anemia dating back many years. Denies any history of sickle cell trait or thallasemia he is aware of.     Recent labs show: 9/18/2023: WBC 5.7, hemoglobin 10.6, MCV 82, platelet count 277. Differential is normal. MCV in the range of 82 to 87. Creatinine has been in the range of 0.73 to 0.95. Normal liver enzymes. Calcium is normal.       Oncology/Hematology History    No history exists.       Review of Systems   Constitutional:  Negative for appetite change, diaphoresis, fatigue, fever, unexpected weight gain and unexpected weight loss.   HENT:  Negative for hearing loss, mouth sores, sore throat, swollen glands, trouble swallowing and voice change.    Eyes:  Negative for blurred vision.   Respiratory:  Negative for cough, shortness of breath and wheezing.    Cardiovascular:   Negative for chest pain and palpitations.   Gastrointestinal:  Negative for abdominal pain, blood in stool, constipation, diarrhea, nausea and vomiting.   Endocrine: Negative for cold intolerance and heat intolerance.   Genitourinary:  Negative for difficulty urinating, dysuria, frequency, hematuria and urinary incontinence.   Musculoskeletal:  Positive for arthralgias. Negative for back pain and myalgias.   Skin:  Negative for rash, skin lesions and wound.   Neurological:  Negative for dizziness, seizures, weakness, numbness and headache.   Hematological:  Does not bruise/bleed easily.   Psychiatric/Behavioral:  Negative for depressed mood. The patient is not nervous/anxious.    All other systems reviewed and are negative.      Current Outpatient Medications on File Prior to Visit   Medication Sig Dispense Refill    acetaminophen (TYLENOL) 650 MG 8 hr tablet       flecainide (TAMBOCOR) 100 MG tablet Take 1 tablet by mouth 2 (Two) Times a Day.      ibuprofen (ADVIL,MOTRIN) 800 MG tablet Take 1 tablet by mouth 3 (Three) Times a Day. 90 tablet 5    dorzolamide (TRUSOPT) 2 % ophthalmic solution dorzolamide 2 % eye drops   INSTILL 1 DROPS INTO EACH EYE THREE TIMES DAILY (Patient not taking: Reported on 11/2/2023)      doxycycline (MONODOX) 100 MG capsule  (Patient not taking: Reported on 11/2/2023)      gabapentin (NEURONTIN) 300 MG capsule Take 1 capsule by mouth Every Evening. (Patient not taking: Reported on 11/2/2023)      gabapentin (NEURONTIN) 600 MG tablet  (Patient not taking: Reported on 11/2/2023)      guaifenesin-dextromethorphan (MUCINEX DM)  MG tablet sustained-release 12 hour tablet Take 2 tablets by mouth 2 (Two) Times a Day As Needed (Cough). (Patient not taking: Reported on 11/2/2023) 40 tablet 0    ibuprofen (ADVIL,MOTRIN) 600 MG tablet Take 1 tablet by mouth Every 12 (Twelve) Hours. (Patient not taking: Reported on 11/2/2023)      Netarsudil Dimesylate (Rhopressa) 0.02 % solution Rhopressa 0.02  % eye drops (Patient not taking: Reported on 11/2/2023)      sildenafil (VIAGRA) 50 MG tablet sildenafil 50 mg tablet   TAKE 1 TABLET BY MOUTH ONCE DAILY AS NEEDED (Patient not taking: Reported on 11/2/2023)      tiZANidine (ZANAFLEX) 2 MG tablet tizanidine 2 mg tablet   TAKE 1 TABLET BY MOUTH THREE TIMES DAILY AS NEEDED (Patient not taking: Reported on 11/2/2023)      traMADol (ULTRAM) 50 MG tablet Take 1 tablet by mouth Every 8 (Eight) Hours As Needed. (Patient not taking: Reported on 11/2/2023)      Vyzulta 0.024 % solution INSTILL 1 DROP INTO EACH EYE EVERY DAY AT BEDTIME (Patient not taking: Reported on 11/2/2023)      [DISCONTINUED] amoxicillin-clavulanate (AUGMENTIN) 875-125 MG per tablet Take 1 tablet by mouth 2 (Two) Times a Day. (Patient not taking: Reported on 11/2/2023) 20 tablet 0    [DISCONTINUED] azithromycin (Zithromax Z-Williams) 250 MG tablet Take 2 tablets by mouth on day 1, then 1 tablet daily on days 2-5 (Patient not taking: Reported on 11/2/2023) 6 tablet 0    [DISCONTINUED] brimonidine-timolol (COMBIGAN) 0.2-0.5 % ophthalmic solution Combigan 0.2 %-0.5 % eye drops   INSTILL 1 DROP INTO EACH EYE TWICE DAILY (Patient not taking: Reported on 11/2/2023)      [DISCONTINUED] Brinzolamide (AZOPT OP) Apply 1 drop to eye(s) as directed by provider 3 (Three) Times a Day. LEFT EYE ONLY (Patient not taking: Reported on 11/2/2023)      [DISCONTINUED] diclofenac (VOLTAREN) 50 MG EC tablet Take 1 tablet by mouth 2 (Two) Times a Day. (Patient not taking: Reported on 11/2/2023) 60 tablet 1    [DISCONTINUED] Diclofenac Sodium (VOLTAREN) 1 % gel gel diclofenac 1 % topical gel   APPLY 3 TO 4 GRAMS TO AFFECTED JOINT 2 TIMES A DAY (Patient not taking: Reported on 11/2/2023)       No current facility-administered medications on file prior to visit.       No Known Allergies  Past Medical History:   Diagnosis Date    Arthritis     Atrial fibrillation 2000    WENT INTO A-FIB FOLLOWING HERNIA SURGERY    Decreased ROM of left  shoulder     FROZEN LEFT SHOULDER    Glaucoma     Limb pain     Limb swelling      Past Surgical History:   Procedure Laterality Date    ABDOMINAL SURGERY      UMBILICAL HERNIA REPAIRED X 2    CARPAL TUNNEL RELEASE WITH CUBITAL TUNNEL RELEASE      FROM SHOULDER TO ULNA    COLONOSCOPY      Rainer-2015-colon polyps    COLONOSCOPY N/A 6/24/2021    Procedure: COLONOSCOPY;  Surgeon: Rudolph Spear MD;  [facility] ENDOSCOPY;  Service: Gastroenterology;  Laterality: N/A;  normal    EYE SURGERY      GLAUCOMA SURGERY    EYE SURGERY      RIGHT EYE CATARACT REMOVED    HERNIA REPAIR      SHOULDER ARTHROSCOPY       Social History     Socioeconomic History    Marital status:    Tobacco Use    Smoking status: Never     Passive exposure: Past    Smokeless tobacco: Never   Vaping Use    Vaping Use: Never used   Substance and Sexual Activity    Alcohol use: Yes     Comment: OCCASSIONALLY    Drug use: Never    Sexual activity: Defer     Family History   Problem Relation Age of Onset    Cancer Father     Diabetes Father      Immunization History   Administered Date(s) Administered    31-influenza Vac Quardvalent Preservativ 10/15/2019, 11/12/2021, 09/18/2023    Influenza Injectable Mdck Pf Quad 10/24/2018    Influenza Quad Vaccine (Inpatient) 10/10/2017       Tobacco Use: Low Risk  (11/2/2023)    Patient History     Smoking Tobacco Use: Never     Smokeless Tobacco Use: Never     Passive Exposure: Past       Objective     Physical Exam  Vitals and nursing note reviewed.   Constitutional:       Appearance: Normal appearance.   HENT:      Head: Normocephalic.      Nose: Nose normal.      Mouth/Throat:      Mouth: Mucous membranes are moist.   Eyes:      Pupils: Pupils are equal, round, and reactive to light.   Cardiovascular:      Rate and Rhythm: Normal rate and regular rhythm.      Pulses: Normal pulses.      Heart sounds: Normal heart sounds. No murmur heard.  Pulmonary:      Effort: Pulmonary effort is normal. No  "respiratory distress.      Breath sounds: Normal breath sounds.   Abdominal:      General: Bowel sounds are normal. There is no distension.      Palpations: Abdomen is soft.      Tenderness: There is no abdominal tenderness.   Musculoskeletal:         General: Normal range of motion.      Cervical back: Normal range of motion and neck supple.   Skin:     General: Skin is warm and dry.      Capillary Refill: Capillary refill takes less than 2 seconds.   Neurological:      General: No focal deficit present.      Mental Status: He is alert and oriented to person, place, and time.   Psychiatric:         Mood and Affect: Mood normal.         Behavior: Behavior normal.         Thought Content: Thought content normal.         Judgment: Judgment normal.         Vitals:    11/02/23 1016   BP: 134/76   Pulse: 61   Resp: 16   Temp: 97.8 °F (36.6 °C)   SpO2: 94%   Weight: 110 kg (243 lb 9.7 oz)   Height: 193 cm (75.98\")   PainSc: 0-No pain       Wt Readings from Last 3 Encounters:   11/02/23 110 kg (243 lb 9.7 oz)   08/07/23 110 kg (243 lb 4.8 oz)   06/21/23 115 kg (254 lb)       ECOG score: 0         ECOG: (0) Fully Active - Able to Carry On All Pre-disease Performance Without Restriction  Fall Risk Assessment was completed, and patient is at low risk for falls.  PHQ-9 Total Score: 0       The patient is not  experiencing fatigue. Fatigue score: 0    PT/OT Functional Screening: PT fx screen : No needs identified  Speech Functional Screening: Speech fx screen : No needs identified  Rehab to be ordered: Rehab to be ordered : No needs identified        Result Review :  The following data was reviewed by: BRENDA Riddle on 11/02/2023:  Lab Results   Component Value Date    HGB 11.5 (L) 03/13/2023    HCT 35.6 (L) 03/13/2023    MCV 84.2 03/13/2023     03/13/2023    WBC 9.83 03/13/2023    NEUTROABS 7.86 (H) 03/13/2023    LYMPHSABS 1.03 03/13/2023    MONOSABS 0.70 03/13/2023    EOSABS 0.17 03/13/2023    BASOSABS " "0.03 03/13/2023     Lab Results   Component Value Date    GLUCOSE 103 (H) 03/13/2023    BUN 11 03/13/2023    CREATININE 0.91 03/13/2023     03/13/2023    K 4.1 03/13/2023     03/13/2023    CO2 23.9 03/13/2023    CALCIUM 9.1 03/13/2023    PROTEINTOT 7.2 03/13/2023    ALBUMIN 4.2 03/13/2023    BILITOT 0.3 03/13/2023    ALKPHOS 89 03/13/2023    AST 33 03/13/2023    ALT 26 03/13/2023        No results found for: \"IRON\", \"LABIRON\", \"TRANSFERRIN\", \"TIBC\"  No results found for: \"LDH\", \"FERRITIN\", \"AKTUXQTT50\", \"FOLATE\"  No results found for: \"PSA\", \"CEA\", \"AFP\", \"\", \"\"    XR Chest 2 View    Result Date: 8/7/2023    1. Right lower lobe opacities, which may indicate pneumonia given patient's symptoms. 2. Trace right pleural effusion.     RANDY SANABRIA MD       Electronically Signed and Approved By: RANDY SANABRIA MD on 8/07/2023 at 11:00                    Assessment and Plan:  Diagnoses and all orders for this visit:    1. Chronic anemia (Primary)  -     Iron Profile  -     Ferritin  -     Vitamin B12  -     Folate  -     Peripheral Blood Smear  -     CBC & Differential  -     Comprehensive Metabolic Panel  -     Occult Blood, Fecal By Immunoassay - Stool, Per Rectum  -     Reticulocytes  -     SHARIFA, PE & Free LT Chains, Ser    Chronic normocytic anemia dating back many years with hemoglobin in the range of 10.6 to 11.8 and likely been present for many years. Most recent hemoglobin of 10.6. Reports has taken oral iron in the past and did have prior GI issues with the oral iron he took. We did discuss the possibility of trying ferrous gluconate to see if he could tolerate this. We did discuss the possibility of IV iron infusions as well depending on insurance approval. Last C scope in June of 2021 was normal. He denies any ongoing persistent fatigue, GI blood loss of any sorts or any ongoing weight loss or dysphagia.     Will check stool for OB and if positive, will refer to GI. Check iron panel, " ferritin, folate, B-12, retic count, SPEP, SFLC and QUIGS to rule out any underlying MGUS or Myeloma. Prior CMP did not show any renal dysfunction. I do see where he has Ibuprofen and diclofenac on his med list and chronic anemia may be secondary may be secondary to NSAIDS use.     Does not plan to do labs today due to his work schedule.     If iron panel is low, then may consider trying ferrous gluconate first.     Recheck labs in 4 months with cbc, iron panel, ferritin with follow up with MD.     I spent 30 minutes caring for Abelino on this date of service. This time includes time spent by me in the following activities:preparing for the visit, reviewing tests, obtaining and/or reviewing a separately obtained history, performing a medically appropriate examination and/or evaluation , counseling and educating the patient/family/caregiver, ordering medications, tests, or procedures, referring and communicating with other health care professionals , documenting information in the medical record, independently interpreting results and communicating that information with the patient/family/caregiver, and care coordination    Patient Follow Up: 4 months with MD.   Patient was given instructions and counseling regarding his condition or for health maintenance advice. Please see specific information pulled into the AVS if appropriate.     Jewell Cabrera, APRN    11/2/2023    .tob

## 2023-11-03 ENCOUNTER — PATIENT ROUNDING (BHMG ONLY) (OUTPATIENT)
Dept: ONCOLOGY | Facility: HOSPITAL | Age: 58
End: 2023-11-03
Payer: COMMERCIAL

## 2023-11-03 NOTE — PROGRESS NOTES
November 3, 2023    Hello, may I speak with Abelino Canales?    My name is Yuly.      I am  with [facility]  [address] [place], [place] [address]  [place] [address] [phone].    Before we get started may I verify your date of birth? 1965    I am calling to officially welcome you to our practice and ask about your recent visit. Is this a good time to talk? No- Left Voicemail    Tell me about your visit with us. What things went well?       We're always looking for ways to make our patients' experiences even better. Do you have recommendations on ways we may improve?  no- Left Voicemail    Overall were you satisfied with your first visit to our practice? No- Left Voicemail       I appreciate you taking the time to speak with me today. Is there anything else I can do for you? No- Left Voicemail      Thank you, and have a great day.

## 2023-11-08 ENCOUNTER — OFFICE VISIT (OUTPATIENT)
Dept: ORTHOPEDIC SURGERY | Facility: CLINIC | Age: 58
End: 2023-11-08
Payer: COMMERCIAL

## 2023-11-08 VITALS
HEART RATE: 65 BPM | BODY MASS INDEX: 29.47 KG/M2 | DIASTOLIC BLOOD PRESSURE: 77 MMHG | SYSTOLIC BLOOD PRESSURE: 125 MMHG | WEIGHT: 242 LBS | OXYGEN SATURATION: 96 % | HEIGHT: 76 IN

## 2023-11-08 DIAGNOSIS — M54.50 ACUTE LOW BACK PAIN, UNSPECIFIED BACK PAIN LATERALITY, UNSPECIFIED WHETHER SCIATICA PRESENT: ICD-10-CM

## 2023-11-08 DIAGNOSIS — M25.551 RIGHT HIP PAIN: Primary | ICD-10-CM

## 2023-11-08 NOTE — PROGRESS NOTES
"Chief Complaint  Follow-up of the Right Hip    Subjective          Abelino Canales presents to [facility]   History of Present Illness    Abelino Canales presents today for a follow-up of his right hip.  Patient has right hip pain that he states is along the posterior portion and the lateral portion.  He does occasionally experience pain down the leg.  He states that he takes Tylenol or ibuprofen as needed.  He denies any lower extremity numbness or tingling.  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  He denies any pain to the groin.  Patient reports a history of scoliosis.  He states that his primary care provider has placed an order for an MRI of his back that he has not yet obtained.      No Known Allergies     Social History     Socioeconomic History    Marital status:    Tobacco Use    Smoking status: Never     Passive exposure: Past    Smokeless tobacco: Never   Vaping Use    Vaping Use: Never used   Substance and Sexual Activity    Alcohol use: Yes     Comment: OCCASSIONALLY    Drug use: Never    Sexual activity: Defer        I reviewed the patient's chief complaint, history of present illness, review of systems, past medical history, surgical history, family history, social history, medications, and allergy list.     REVIEW OF SYSTEMS    Constitutional: Denies fevers, chills, weight loss  Cardiovascular: Denies chest pain, shortness of breath  Skin: Denies rashes, acute skin changes  Neurologic: Denies headache, loss of consciousness  MSK: Right hip pain      Objective   Vital Signs:   /77   Pulse 65   Ht 193 cm (76\")   Wt 110 kg (242 lb)   SpO2 96%   BMI 29.46 kg/m²     Body mass index is 29.46 kg/m².    Physical Exam    General: Alert. No acute distress.   Right lower extremity: Hip flexion intact.  5 out of 5 hip abduction.  No pain to the groin with passive hip range of motion.  Nontender to the greater " trochanteric bursa.  Pain with straight leg raise to the lower back.  Knee extensor mechanism intact.  Calf soft, nontender.  Demonstrates active ankle plantarflexion and dorsiflexion.  Sensation intact over dorsal and plantar foot.  Palpable pulses.    Procedures    Imaging Results (Most Recent)       None                   Assessment and Plan    Diagnoses and all orders for this visit:    1. Right hip pain (Primary)    2. Acute low back pain, unspecified back pain laterality, unspecified whether sciatica present        Abelino Canales presents today for follow-up of his right hip pain.  Patient symptoms appear to be more related to his back.  He states that an MRI of his back has been ordered by his PCP but he has not yet obtained this imaging.  He is instructed to continue with the MRI and follow-up with his PCP regarding further evaluation to a back specialist.      Patient will follow up as needed.      Call or return if symptoms worsen or patient has any concerns.       Follow Up   Return if symptoms worsen or fail to improve.  Patient was given instructions and counseling regarding his condition or for health maintenance advice. Please see specific information pulled into the AVS if appropriate.     Brenda Briseno PA-C  11/08/23  10:01 EST

## 2023-12-27 ENCOUNTER — LAB (OUTPATIENT)
Dept: LAB | Facility: HOSPITAL | Age: 58
End: 2023-12-27
Payer: COMMERCIAL

## 2023-12-27 LAB
ALBUMIN SERPL-MCNC: 4.2 G/DL (ref 3.5–5.2)
ALBUMIN/GLOB SERPL: 1.5 G/DL
ALP SERPL-CCNC: 101 U/L (ref 39–117)
ALT SERPL W P-5'-P-CCNC: 17 U/L (ref 1–41)
ANION GAP SERPL CALCULATED.3IONS-SCNC: 9.6 MMOL/L (ref 5–15)
ANISOCYTOSIS BLD QL: NORMAL
AST SERPL-CCNC: 18 U/L (ref 1–40)
BILIRUB SERPL-MCNC: 0.4 MG/DL (ref 0–1.2)
BUN SERPL-MCNC: 14 MG/DL (ref 6–20)
BUN/CREAT SERPL: 13.9 (ref 7–25)
CALCIUM SPEC-SCNC: 8.8 MG/DL (ref 8.6–10.5)
CHLORIDE SERPL-SCNC: 108 MMOL/L (ref 98–107)
CO2 SERPL-SCNC: 23.4 MMOL/L (ref 22–29)
CREAT SERPL-MCNC: 1.01 MG/DL (ref 0.76–1.27)
CYTO UR: NORMAL
DEPRECATED RDW RBC AUTO: 52.9 FL (ref 37–54)
EGFRCR SERPLBLD CKD-EPI 2021: 85.7 ML/MIN/1.73
EOSINOPHIL # BLD MANUAL: 0.18 10*3/MM3 (ref 0–0.4)
EOSINOPHIL NFR BLD MANUAL: 3 % (ref 0.3–6.2)
ERYTHROCYTE [DISTWIDTH] IN BLOOD BY AUTOMATED COUNT: 17.7 % (ref 12.3–15.4)
FERRITIN SERPL-MCNC: 36.53 NG/ML (ref 30–400)
FOLATE SERPL-MCNC: 14.3 NG/ML (ref 4.78–24.2)
GLOBULIN UR ELPH-MCNC: 2.8 GM/DL
GLUCOSE SERPL-MCNC: 92 MG/DL (ref 65–99)
HCT VFR BLD AUTO: 34.7 % (ref 37.5–51)
HEMOCCULT STL QL IA: NEGATIVE
HGB BLD-MCNC: 10.4 G/DL (ref 13–17.7)
IRON 24H UR-MRATE: 36 MCG/DL (ref 59–158)
IRON SATN MFR SERPL: 8 % (ref 20–50)
LAB AP CASE REPORT: NORMAL
LAB AP CLINICAL INFORMATION: NORMAL
LYMPHOCYTES # BLD MANUAL: 1.43 10*3/MM3 (ref 0.7–3.1)
LYMPHOCYTES NFR BLD MANUAL: 7 % (ref 5–12)
MCH RBC QN AUTO: 24.8 PG (ref 26.6–33)
MCHC RBC AUTO-ENTMCNC: 30 G/DL (ref 31.5–35.7)
MCV RBC AUTO: 82.8 FL (ref 79–97)
MONOCYTES # BLD: 0.42 10*3/MM3 (ref 0.1–0.9)
NEUTROPHILS # BLD AUTO: 3.92 10*3/MM3 (ref 1.7–7)
NEUTROPHILS NFR BLD MANUAL: 66 % (ref 42.7–76)
OVALOCYTES BLD QL SMEAR: NORMAL
PATH REPORT.FINAL DX SPEC: NORMAL
PATH REPORT.GROSS SPEC: NORMAL
PATHOLOGY REVIEW: YES
PLATELET # BLD AUTO: 220 10*3/MM3 (ref 140–450)
PMV BLD AUTO: 10.6 FL (ref 6–12)
POTASSIUM SERPL-SCNC: 4.1 MMOL/L (ref 3.5–5.2)
PROT SERPL-MCNC: 7 G/DL (ref 6–8.5)
RBC # BLD AUTO: 4.19 10*6/MM3 (ref 4.14–5.8)
RETICS # AUTO: 0.08 10*6/MM3 (ref 0.02–0.13)
RETICS/RBC NFR AUTO: 1.85 % (ref 0.7–1.9)
SMALL PLATELETS BLD QL SMEAR: ADEQUATE
SODIUM SERPL-SCNC: 141 MMOL/L (ref 136–145)
TIBC SERPL-MCNC: 425 MCG/DL (ref 298–536)
TRANSFERRIN SERPL-MCNC: 285 MG/DL (ref 200–360)
VARIANT LYMPHS NFR BLD MANUAL: 1 % (ref 0–5)
VARIANT LYMPHS NFR BLD MANUAL: 23 % (ref 19.6–45.3)
VIT B12 BLD-MCNC: 1804 PG/ML (ref 211–946)
WBC MORPH BLD: NORMAL
WBC NRBC COR # BLD AUTO: 5.94 10*3/MM3 (ref 3.4–10.8)

## 2023-12-27 PROCEDURE — 82746 ASSAY OF FOLIC ACID SERUM: CPT | Performed by: NURSE PRACTITIONER

## 2023-12-27 PROCEDURE — 82607 VITAMIN B-12: CPT | Performed by: NURSE PRACTITIONER

## 2023-12-27 PROCEDURE — 84165 PROTEIN E-PHORESIS SERUM: CPT | Performed by: NURSE PRACTITIONER

## 2023-12-27 PROCEDURE — 82784 ASSAY IGA/IGD/IGG/IGM EACH: CPT | Performed by: NURSE PRACTITIONER

## 2023-12-27 PROCEDURE — 86334 IMMUNOFIX E-PHORESIS SERUM: CPT | Performed by: NURSE PRACTITIONER

## 2023-12-27 PROCEDURE — 80053 COMPREHEN METABOLIC PANEL: CPT | Performed by: NURSE PRACTITIONER

## 2023-12-27 PROCEDURE — 83521 IG LIGHT CHAINS FREE EACH: CPT | Performed by: NURSE PRACTITIONER

## 2023-12-27 PROCEDURE — 82274 ASSAY TEST FOR BLOOD FECAL: CPT | Performed by: NURSE PRACTITIONER

## 2023-12-28 LAB
ALBUMIN SERPL ELPH-MCNC: 3.6 G/DL (ref 2.9–4.4)
ALBUMIN/GLOB SERPL: 1.3 {RATIO} (ref 0.7–1.7)
ALPHA1 GLOB SERPL ELPH-MCNC: 0.2 G/DL (ref 0–0.4)
ALPHA2 GLOB SERPL ELPH-MCNC: 0.6 G/DL (ref 0.4–1)
B-GLOBULIN SERPL ELPH-MCNC: 0.9 G/DL (ref 0.7–1.3)
GAMMA GLOB SERPL ELPH-MCNC: 1.1 G/DL (ref 0.4–1.8)
GLOBULIN SER-MCNC: 2.9 G/DL (ref 2.2–3.9)
IGA SERPL-MCNC: 241 MG/DL (ref 90–386)
IGG SERPL-MCNC: 1185 MG/DL (ref 603–1613)
IGM SERPL-MCNC: 80 MG/DL (ref 20–172)
INTERPRETATION SERPL IEP-IMP: NORMAL
KAPPA LC FREE SER-MCNC: 14.2 MG/L (ref 3.3–19.4)
KAPPA LC FREE/LAMBDA FREE SER: 1.04 {RATIO} (ref 0.26–1.65)
LABORATORY COMMENT REPORT: NORMAL
LAMBDA LC FREE SERPL-MCNC: 13.7 MG/L (ref 5.7–26.3)
M PROTEIN SERPL ELPH-MCNC: NORMAL G/DL
PROT SERPL-MCNC: 6.5 G/DL (ref 6–8.5)

## 2024-11-18 ENCOUNTER — OFFICE VISIT (OUTPATIENT)
Dept: ORTHOPEDIC SURGERY | Facility: CLINIC | Age: 59
End: 2024-11-18
Payer: COMMERCIAL

## 2024-11-18 VITALS — DIASTOLIC BLOOD PRESSURE: 85 MMHG | OXYGEN SATURATION: 97 % | SYSTOLIC BLOOD PRESSURE: 138 MMHG | HEART RATE: 66 BPM

## 2024-11-18 DIAGNOSIS — M54.50 ACUTE LOW BACK PAIN, UNSPECIFIED BACK PAIN LATERALITY, UNSPECIFIED WHETHER SCIATICA PRESENT: Primary | ICD-10-CM

## 2024-11-18 DIAGNOSIS — M25.551 RIGHT HIP PAIN: ICD-10-CM

## 2024-11-18 PROCEDURE — 99213 OFFICE O/P EST LOW 20 MIN: CPT | Performed by: PHYSICIAN ASSISTANT

## 2025-04-18 ENCOUNTER — HOSPITAL ENCOUNTER (OUTPATIENT)
Dept: GENERAL RADIOLOGY | Facility: HOSPITAL | Age: 60
Discharge: HOME | End: 2025-04-18
Admitting: INTERNAL MEDICINE
Payer: COMMERCIAL

## 2025-04-18 ENCOUNTER — TRANSCRIBE ORDERS (OUTPATIENT)
Dept: GENERAL RADIOLOGY | Facility: HOSPITAL | Age: 60
End: 2025-04-18
Payer: COMMERCIAL

## 2025-04-18 DIAGNOSIS — M54.41 RIGHT-SIDED LOW BACK PAIN WITH RIGHT-SIDED SCIATICA, UNSPECIFIED CHRONICITY: Primary | ICD-10-CM

## 2025-04-18 DIAGNOSIS — M54.41 RIGHT-SIDED LOW BACK PAIN WITH RIGHT-SIDED SCIATICA, UNSPECIFIED CHRONICITY: ICD-10-CM

## 2025-04-18 PROCEDURE — 72110 X-RAY EXAM L-2 SPINE 4/>VWS: CPT

## 2025-07-13 ENCOUNTER — HOSPITAL ENCOUNTER (EMERGENCY)
Facility: HOSPITAL | Age: 60
Discharge: HOME | End: 2025-07-13
Attending: EMERGENCY MEDICINE | Admitting: EMERGENCY MEDICINE
Payer: COMMERCIAL

## 2025-07-13 VITALS
RESPIRATION RATE: 15 BRPM | WEIGHT: 255 LBS | HEART RATE: 62 BPM | SYSTOLIC BLOOD PRESSURE: 128 MMHG | HEIGHT: 76 IN | BODY MASS INDEX: 31.05 KG/M2 | DIASTOLIC BLOOD PRESSURE: 86 MMHG | TEMPERATURE: 98.2 F | OXYGEN SATURATION: 96 %

## 2025-07-13 DIAGNOSIS — M87.877 OTHER OSTEONECROSIS, RIGHT TOE(S): Primary | ICD-10-CM

## 2025-07-13 LAB
ALBUMIN SERPL-MCNC: 4.3 G/DL (ref 3.5–5.2)
ALBUMIN/GLOB SERPL: 1.5 G/DL
ALP SERPL-CCNC: 110 U/L (ref 39–117)
ALT SERPL W P-5'-P-CCNC: 19 U/L (ref 1–41)
ANION GAP SERPL CALCULATED.3IONS-SCNC: 9.5 MMOL/L (ref 5–15)
AST SERPL-CCNC: 18 U/L (ref 1–40)
BASOPHILS # BLD AUTO: 0.04 10*3/MM3 (ref 0–0.2)
BASOPHILS NFR BLD AUTO: 0.6 % (ref 0–1.5)
BILIRUB SERPL-MCNC: 0.3 MG/DL (ref 0–1.2)
BUN SERPL-MCNC: 13.4 MG/DL (ref 8–23)
BUN/CREAT SERPL: 17 (ref 7–25)
CALCIUM SPEC-SCNC: 8.9 MG/DL (ref 8.6–10.5)
CHLORIDE SERPL-SCNC: 107 MMOL/L (ref 98–107)
CO2 SERPL-SCNC: 22.5 MMOL/L (ref 22–29)
CREAT SERPL-MCNC: 0.79 MG/DL (ref 0.76–1.27)
CRP SERPL-MCNC: 0.32 MG/DL (ref 0–0.5)
D-LACTATE SERPL-SCNC: 1.4 MMOL/L (ref 0.5–2)
DEPRECATED RDW RBC AUTO: 54.2 FL (ref 37–54)
EGFRCR SERPLBLD CKD-EPI 2021: 101.7 ML/MIN/1.73
EOSINOPHIL # BLD AUTO: 0.28 10*3/MM3 (ref 0–0.4)
EOSINOPHIL NFR BLD AUTO: 4.2 % (ref 0.3–6.2)
ERYTHROCYTE [DISTWIDTH] IN BLOOD BY AUTOMATED COUNT: 17.7 % (ref 12.3–15.4)
ERYTHROCYTE [SEDIMENTATION RATE] IN BLOOD: 12 MM/HR (ref 0–20)
GLOBULIN UR ELPH-MCNC: 2.9 GM/DL
GLUCOSE SERPL-MCNC: 106 MG/DL (ref 65–99)
HCT VFR BLD AUTO: 35.1 % (ref 37.5–51)
HGB BLD-MCNC: 10.6 G/DL (ref 13–17.7)
IMM GRANULOCYTES # BLD AUTO: 0.02 10*3/MM3 (ref 0–0.05)
IMM GRANULOCYTES NFR BLD AUTO: 0.3 % (ref 0–0.5)
LYMPHOCYTES # BLD AUTO: 1.73 10*3/MM3 (ref 0.7–3.1)
LYMPHOCYTES NFR BLD AUTO: 26 % (ref 19.6–45.3)
MCH RBC QN AUTO: 25.5 PG (ref 26.6–33)
MCHC RBC AUTO-ENTMCNC: 30.2 G/DL (ref 31.5–35.7)
MCV RBC AUTO: 84.6 FL (ref 79–97)
MONOCYTES # BLD AUTO: 0.55 10*3/MM3 (ref 0.1–0.9)
MONOCYTES NFR BLD AUTO: 8.3 % (ref 5–12)
NEUTROPHILS NFR BLD AUTO: 4.04 10*3/MM3 (ref 1.7–7)
NEUTROPHILS NFR BLD AUTO: 60.6 % (ref 42.7–76)
NRBC BLD AUTO-RTO: 0 /100 WBC (ref 0–0.2)
PLATELET # BLD AUTO: 234 10*3/MM3 (ref 140–450)
PMV BLD AUTO: 10.5 FL (ref 6–12)
POTASSIUM SERPL-SCNC: 4.3 MMOL/L (ref 3.5–5.2)
PROT SERPL-MCNC: 7.2 G/DL (ref 6–8.5)
RBC # BLD AUTO: 4.15 10*6/MM3 (ref 4.14–5.8)
SODIUM SERPL-SCNC: 139 MMOL/L (ref 136–145)
WBC NRBC COR # BLD AUTO: 6.66 10*3/MM3 (ref 3.4–10.8)

## 2025-07-13 PROCEDURE — 85025 COMPLETE CBC W/AUTO DIFF WBC: CPT

## 2025-07-13 PROCEDURE — 83605 ASSAY OF LACTIC ACID: CPT

## 2025-07-13 PROCEDURE — 80053 COMPREHEN METABOLIC PANEL: CPT

## 2025-07-13 PROCEDURE — 85652 RBC SED RATE AUTOMATED: CPT

## 2025-07-13 PROCEDURE — 86140 C-REACTIVE PROTEIN: CPT

## 2025-07-13 PROCEDURE — 99283 EMERGENCY DEPT VISIT LOW MDM: CPT

## 2025-07-13 RX ORDER — SULFAMETHOXAZOLE AND TRIMETHOPRIM 800; 160 MG/1; MG/1
1 TABLET ORAL 2 TIMES DAILY
Qty: 14 TABLET | Refills: 0 | Status: SHIPPED | OUTPATIENT
Start: 2025-07-13 | End: 2025-07-20

## 2025-07-13 RX ORDER — ACETAMINOPHEN 325 MG/1
975 TABLET ORAL ONCE
Status: DISCONTINUED | OUTPATIENT
Start: 2025-07-13 | End: 2025-07-13 | Stop reason: HOSPADM

## 2025-07-13 NOTE — ED PROVIDER NOTES
Time: 5:33 PM EDT  Date of encounter:  7/13/2025  Independent Historian/Clinical History and Information was obtained by:   Patient    History is limited by: N/A    Chief Complaint   Patient presents with    Osteomyelitis          History of Present Illness:  Patient is a 59 y.o. year old male who presents to the emergency department for evaluation of right second toe pain for the past 3 to 4 weeks.  Patient states it hurts worse when he is wearing shoes and feels like pressure.  Patient went to urgent care [facility] where they did an x-ray and told him he has osteomyelitis and come to the ED.  They put in referral for podiatry he denies fall or injury to the foot.  Denies history of diabetes.  Patient states he is has a history of fungal disease.  He was unaware that he had a closed blister to that second toe.  Denies fevers, nausea or vomiting denies history of osteomyelitis.    Patient Care Team  Primary Care Provider: Rudolph Allison MD    Past Medical History:     No Known Allergies  Past Medical History:   Diagnosis Date    Arthritis     Atrial fibrillation 2000    WENT INTO [facility] FOLLOWING HERNIA SURGERY    Decreased ROM of left shoulder     FROZEN LEFT SHOULDER    Glaucoma     Limb pain     Limb swelling      Past Surgical History:   Procedure Laterality Date    ABDOMINAL SURGERY      UMBILICAL HERNIA REPAIRED X 2    CARPAL TUNNEL RELEASE WITH CUBITAL TUNNEL RELEASE      FROM SHOULDER TO ULNA    COLONOSCOPY      Rainer-2015-colon polyps    COLONOSCOPY N/A 6/24/2021    Procedure: COLONOSCOPY;  Surgeon: Rudolph Spear MD;  Location: [facility];  Service: Gastroenterology;  Laterality: N/A;  normal    EYE SURGERY      GLAUCOMA SURGERY    EYE SURGERY      RIGHT EYE CATARACT REMOVED    HERNIA REPAIR      SHOULDER ARTHROSCOPY       Family History   Problem Relation Age of Onset    Cancer Father     Diabetes Father        Home Medications:  Prior to Admission medications    Medication Sig Start Date End  Date Taking? Authorizing Provider   acetaminophen (TYLENOL) 650 MG 8 hr tablet  11/12/21   Beckie Hillman MD   cephalexin (KEFLEX) 500 MG capsule Take 1 capsule by mouth 3 (Three) Times a Day for 7 days. 7/13/25 7/20/25  Ash Barbour MD   dorzolamide (TRUSOPT) 2 % ophthalmic solution INSTILL 1 DROPS INTO EACH EYE THREE TIMES DAILY 1/30/24   Beckie Hillman MD   flecainide (TAMBOCOR) 100 MG tablet Take 1 tablet by mouth 2 (Two) Times a Day.    Beckie Hillman MD   fluticasone (FLONASE) 50 MCG/ACT nasal spray 2 sprays into the nostril(s) as directed by provider Daily. 1/1/24   Marleni Coon APRN   ibuprofen (ADVIL,MOTRIN) 800 MG tablet TAKE 1 TABLET BY MOUTH TWICE DAILY AS NEEDED WITH FOOD FOR 30 DAYS 3/28/25   Beckie Hillman MD   sildenafil (VIAGRA) 100 MG tablet Take 1 tablet by mouth Daily. 3/28/25   Beckie Hillman MD   Sodium Fluoride 0.2 % solution RINSE WITH 1/2 CAPFUL AT BEDTIME AFTER BRUSHING. DO NOT RINSE. 6/11/25   Beckie Hillman MD   tiZANidine (ZANAFLEX) 2 MG tablet TAKE 1 TABLET BY MOUTH TWICE DAILY AS NEEDED FOR 90 DAYS 2/7/24   Beckie Hillman MD   guaiFENesin (MUCINEX) 600 MG 12 hr tablet Take 2 tablets by mouth 2 (Two) Times a Day.  Patient not taking: Reported on 11/18/2024 1/1/24 7/13/25  Marleni Coon APRN   ondansetron ODT (ZOFRAN-ODT) 4 MG disintegrating tablet Place 1 tablet on the tongue Every 8 (Eight) Hours As Needed for Nausea or Vomiting for up to 10 doses.  Patient not taking: Reported on 11/18/2024 2/28/24 7/13/25  Jacqui Sabillon MD        Social History:   Social History     Tobacco Use    Smoking status: Never     Passive exposure: Past    Smokeless tobacco: Never   Vaping Use    Vaping status: Never Used   Substance Use Topics    Alcohol use: Yes     Comment: OCCASSIONALLY    Drug use: Never         Review of Systems:  Review of Systems   Constitutional: Negative.  Negative for fever.   HENT: Negative.     Eyes:  "Negative.    Respiratory: Negative.     Cardiovascular: Negative.    Gastrointestinal: Negative.  Negative for nausea and vomiting.   Endocrine: Negative.    Genitourinary: Negative.    Musculoskeletal:  Positive for arthralgias. Negative for joint swelling.   Skin: Negative.  Negative for color change and wound.   Allergic/Immunologic: Negative.    Neurological: Negative.    Hematological: Negative.    Psychiatric/Behavioral: Negative.          Physical Exam:  /87 (BP Location: Right arm, Patient Position: Sitting)   Pulse 67   Temp 98.2 °F (36.8 °C) (Oral)   Resp 18   Ht 193 cm (76\")   Wt 116 kg (255 lb)   SpO2 100%   BMI 31.04 kg/m²         Physical Exam  Vitals and nursing note reviewed.   Constitutional:       Appearance: Normal appearance.   HENT:      Head: Normocephalic and atraumatic.      Nose: Nose normal.      Mouth/Throat:      Mouth: Mucous membranes are moist.   Eyes:      Extraocular Movements: Extraocular movements intact.      Conjunctiva/sclera: Conjunctivae normal.      Pupils: Pupils are equal, round, and reactive to light.   Cardiovascular:      Rate and Rhythm: Normal rate and regular rhythm.   Pulmonary:      Effort: Pulmonary effort is normal.      Breath sounds: Normal breath sounds.   Musculoskeletal:         General: No swelling, deformity or signs of injury. Normal range of motion.      Cervical back: Normal range of motion and neck supple.        Feet:    Feet:      Right foot:      Skin integrity: Blister and callus present. No ulcer, skin breakdown, erythema or warmth.      Toenail Condition: Right toenails are abnormally thick. Fungal disease present.     Left foot:      Toenail Condition: Left toenails are abnormally thick. Fungal disease present.     Comments: Pedal pulses 2+ neurovascular intact.  Skin:     General: Skin is warm and dry.      Capillary Refill: Capillary refill takes less than 2 seconds.      Findings: No bruising or erythema.   Neurological:      " General: No focal deficit present.      Mental Status: He is alert and oriented to person, place, and time.   Psychiatric:         Mood and Affect: Mood normal.         Behavior: Behavior normal.                    Medical Decision Making:      Comorbidities that affect care:    Arthritis, Atrial Fibrillation    External Notes reviewed:    Previous Clinic Note: Urgent care visit 7/13/2025 for toe pain and Previous Radiological Studies: X-ray toe from 7/13/2025 showing evidence for osteomyelitis involving the distal phalanx of the second digit.  There is associated soft tissue edema suggesting cellulitis.  No gas production in the soft tissue      The following orders were placed and all results were independently analyzed by me:  Orders Placed This Encounter   Procedures    Comprehensive Metabolic Panel    Lactic Acid, Plasma    Sedimentation Rate    C-reactive Protein    CBC Auto Differential    Ambulatory Referral to Podiatry    Inpatient Podiatry Consult    CBC & Differential       Medications Given in the Emergency Department:  Medications   acetaminophen (TYLENOL) tablet 975 mg (975 mg Oral Not Given 7/13/25 5062)        ED Course:    The patient was initially evaluated in the triage area where orders were placed. The patient was later dispositioned by Mikaela Garcias PA-C.      The patient was advised to stay for completion of workup which includes but is not limited to communication of labs and radiological results, reassessment and plan. The patient was advised that leaving prior to disposition by a provider could result in critical findings that are not communicated to the patient.     ED Course as of 07/13/25 1834   Sun Jul 13, 2025   1828 Evidence for osteomyelitis involving the distal phalanx of the second digit. There is associated soft tissue edema suggesting cellulitis. There is no gas production in the soft tissues.   [AJ]   1828 Consulted with Dr. Jones, podiatrist who recommends discharging  patient home on antibiotics have him call the office and schedule follow-up. [AJ]      ED Course User Index  [AJ] Mikaela Garcias PA-C       Labs:    Lab Results (last 24 hours)       Procedure Component Value Units Date/Time    Comprehensive Metabolic Panel [[id number]]  (Abnormal) Collected: 07/13/25 1738    Specimen: Blood Updated: 07/13/25 1813     Glucose 106 mg/dL      BUN 13.4 mg/dL      Creatinine 0.79 mg/dL      Sodium 139 mmol/L      Potassium 4.3 mmol/L      Chloride 107 mmol/L      CO2 22.5 mmol/L      Calcium 8.9 mg/dL      Total Protein 7.2 g/dL      Albumin 4.3 g/dL      ALT (SGPT) 19 U/L      AST (SGOT) 18 U/L      Alkaline Phosphatase 110 U/L      Total Bilirubin 0.3 mg/dL      Globulin 2.9 gm/dL      A/G Ratio 1.5 g/dL      BUN/Creatinine Ratio 17.0     Anion Gap 9.5 mmol/L      eGFR 101.7 mL/min/1.73     Narrative:      GFR Categories in Chronic Kidney Disease (CKD)              GFR Category          GFR (mL/min/1.73)    Interpretation  G1                    90 or greater        Normal or high (1)  G2                    60-89                Mild decrease (1)  G3a                   45-59                Mild to moderate decrease  G3b                   30-44                Moderate to severe decrease  G4                    15-29                Severe decrease  G5                    14 or less           Kidney failure    (1)In the absence of evidence of kidney disease, neither GFR category G1 or G2 fulfill the criteria for CKD.    eGFR calculation 2021 CKD-EPI creatinine equation, which does not include race as a factor    CBC & Differential [[id number]]  (Abnormal) Collected: 07/13/25 1738    Specimen: Blood Updated: 07/13/25 1752    Narrative:      The following orders were created for panel order CBC & Differential.  Procedure                               Abnormality         Status                     ---------                               -----------         ------                     CBC  Auto Differential[[id number]]        Abnormal            Final result                 Please view results for these tests on the individual orders.    Lactic Acid, Plasma [[id number]]  (Normal) Collected: 07/13/25 1738    Specimen: Blood Updated: 07/13/25 1812     Lactate 1.4 mmol/L     Sedimentation Rate [[id number]]  (Normal) Collected: 07/13/25 1738    Specimen: Blood Updated: 07/13/25 1808     Sed Rate 12 mm/hr     C-reactive Protein [[id number]]  (Normal) Collected: 07/13/25 1738    Specimen: Blood Updated: 07/13/25 1813     C-Reactive Protein 0.32 mg/dL     CBC Auto Differential [[id number]]  (Abnormal) Collected: 07/13/25 1738    Specimen: Blood Updated: 07/13/25 1752     WBC 6.66 10*3/mm3      RBC 4.15 10*6/mm3      Hemoglobin 10.6 g/dL      Hematocrit 35.1 %      MCV 84.6 fL      MCH 25.5 pg      MCHC 30.2 g/dL      RDW 17.7 %      RDW-SD 54.2 fl      MPV 10.5 fL      Platelets 234 10*3/mm3      Neutrophil % 60.6 %      Lymphocyte % 26.0 %      Monocyte % 8.3 %      Eosinophil % 4.2 %      Basophil % 0.6 %      Immature Grans % 0.3 %      Neutrophils, Absolute 4.04 10*3/mm3      Lymphocytes, Absolute 1.73 10*3/mm3      Monocytes, Absolute 0.55 10*3/mm3      Eosinophils, Absolute 0.28 10*3/mm3      Basophils, Absolute 0.04 10*3/mm3      Immature Grans, Absolute 0.02 10*3/mm3      nRBC 0.0 /100 WBC              Imaging:    XR Toe 2+ View Right  Result Date: 7/13/2025  XR TOE 2+ VW RIGHT Date of Exam: 7/13/2025 3:23 PM EDT Indication: Second toe?distal tip pain for the last month Comparison: None available. Findings: Soft tissue edema is noted throughout the second digit. There is evidence for erosion at the distal tip of the distal phalanx. No finding suggest changes of soft tissue cellulitis with underlying osteomyelitis of the distal phalanx. There is no gas production in the soft tissues.     Impression: Evidence for osteomyelitis involving the distal phalanx of the second digit. There is associated soft tissue  edema suggesting cellulitis. There is no gas production in the soft tissues. Electronically Signed: Bill Duffy MD  7/13/2025 3:38 PM EDT  Workstation ID: [id number]        Differential Diagnosis and Discussion:      Extremity Pain: Differential diagnosis includes but is not limited to soft tissue sprain, tendonitis, tendon injury, dislocation, fracture, deep vein thrombosis, arterial insufficiency, osteoarthritis, bursitis, and ligamentous damage.  Wound Evaluation: Differential diagnosis includes but is not limited to laceration, abrasion, puncture, burn, ulcer, cellulitis, abscess, vasculitis, malignancy, and rash.    PROCEDURES:    Labs were collected in the emergency department and all labs were reviewed and interpreted by me.    No orders to display        Procedures    MDM                   Patient Care Considerations:    MRI: I considered ordering an MRI however patient can have completed as outpatient      Consultants/Shared Management Plan:    SHARED VISIT: I have discussed the case with my supervising physician, Dr. Dorsey who states reviewed labs. The substantive portion of the medical decision was made by the attesting physician who made or approve the management plan and will take responsibility for the patient.  Clinical findings were discussed and ultimate disposition was made in consult with supervising physician.  Consultant: I have discussed the case with Dr. Jones, podiatry who states discharge patient home with antibiotics and follow-up in office    Social Determinants of Health:    Patient is independent, reliable, and has access to care.       Disposition and Care Coordination:    Discharged: I considered escalation of care by admitting this patient to the hospital, however consulted podiatry and states patient can follow-up in    I have explained the patient´s condition, diagnoses and treatment plan based on the information available to me at this time. I have answered questions and  addressed any concerns. The patient has a good  understanding of the patient´s diagnosis, condition, and treatment plan as can be expected at this point. The vital signs have been stable. The patient´s condition is stable and appropriate for discharge from the emergency department.      The patient will pursue further outpatient evaluation with the primary care physician or other designated or consulting physician as outlined in the discharge instructions. They are agreeable to this plan of care and follow-up instructions have been explained in detail. The patient has received these instructions in written format and has expressed an understanding of the discharge instructions. The patient is aware that any significant change in condition or worsening of symptoms should prompt an immediate return to this or the closest emergency department or call to 1.  I have explained discharge medications and the need for follow up with the patient/caretakers. This was also printed in the discharge instructions. Patient was discharged with the following medications and follow up:      Medication List        New Prescriptions      sulfamethoxazole-trimethoprim 800-160 MG per tablet  Commonly known as: BACTRIM DS,SEPTRA DS  Take 1 tablet by mouth 2 (Two) Times a Day for 7 days.               Where to Get Your Medications        These medications were sent to [facility]/pharmacy #[id number] - [place], [place] - [address] - [phone]  - [phone] FX  [address] [place] [address]      Hours: 24-hours Phone: [phone]   sulfamethoxazole-trimethoprim 800-160 MG per tablet      Jasbir Jones, DPM  [address] [facility]  [place] [address]  [phone]    Call          Final diagnoses:   Other osteonecrosis, right toe(s)        ED Disposition       ED Disposition   Discharge    Condition   Stable    Comment   --               This medical record created using voice recognition software.              Mikaela Garcias PA-C  07/13/25 1831

## 2025-07-13 NOTE — DISCHARGE INSTRUCTIONS
Your labs here today shows no concerning abnormality.  I have reached out to podiatry, he would like you to call the office to schedule follow-up appointment in office.  Have also sent antibiotics to the pharmacy.  Please  and take as prescribed until finished

## 2025-07-16 ENCOUNTER — OFFICE VISIT (OUTPATIENT)
Dept: PODIATRY | Facility: CLINIC | Age: 60
End: 2025-07-16
Payer: COMMERCIAL

## 2025-07-16 VITALS
SYSTOLIC BLOOD PRESSURE: 118 MMHG | DIASTOLIC BLOOD PRESSURE: 75 MMHG | WEIGHT: 246 LBS | OXYGEN SATURATION: 94 % | TEMPERATURE: 98 F | HEART RATE: 67 BPM | HEIGHT: 76 IN | BODY MASS INDEX: 29.96 KG/M2

## 2025-07-16 DIAGNOSIS — D49.2 BONE TUMOR: ICD-10-CM

## 2025-07-16 DIAGNOSIS — M87.277: Primary | ICD-10-CM

## 2025-07-17 NOTE — PROGRESS NOTES
[facility] - PODIATRY    Today's Date: 07/17/25    Patient Name: Abelino Canales  MRN: [id number]  CSN: [id number]  PCP: Rudolph Allison MD,   Referring Provider: Ash Barbour,*    SUBJECTIVE     Chief Complaint   Patient presents with    Right Foot - Establish Care     2nd toe swelling and pin 4 weeks ago   7/13/25 went to  and was sent to [facility] ER  xrays given antibiotics and has not started and blood work     HPI: Abelino Canales, a 59 y.o.male, presents to clinic.    History of Present Illness  The patient presents for evaluation of toe pain.    He has been experiencing pain in his toe for approximately a month, which intensifies when he applies pressure while walking. The pain is less severe during weekends but worsens during his work hours due to increased walking. He reports no specific injury to the toe but mentions that the pain is exacerbated when he accidentally bumps it. He has a callus on the affected toe and has been applying Vaseline, which seems to provide some relief. He is not diabetic. His job as a  involves extensive walking on concrete surfaces.              Past Medical History:   Diagnosis Date    Arthritis     Atrial fibrillation 2000    WENT INTO A-FIB FOLLOWING HERNIA SURGERY    Decreased ROM of left shoulder     FROZEN LEFT SHOULDER    Glaucoma     Limb pain     Limb swelling      Past Surgical History:   Procedure Laterality Date    ABDOMINAL SURGERY      UMBILICAL HERNIA REPAIRED X 2    CARPAL TUNNEL RELEASE WITH CUBITAL TUNNEL RELEASE      FROM SHOULDER TO ULNA    COLONOSCOPY      Rainer-2015-colon polyps    COLONOSCOPY N/A 6/24/2021    Procedure: COLONOSCOPY;  Surgeon: Rudolph Spear MD;  Location: [facility] ENDOSCOPY;  Service: Gastroenterology;  Laterality: N/A;  normal    EYE SURGERY      GLAUCOMA SURGERY    EYE SURGERY      RIGHT EYE CATARACT REMOVED    HERNIA REPAIR      SHOULDER ARTHROSCOPY       Family History   Problem  Relation Age of Onset    Cancer Father     Diabetes Father      Social History     Socioeconomic History    Marital status:    Tobacco Use    Smoking status: Never     Passive exposure: Past    Smokeless tobacco: Never   Vaping Use    Vaping status: Never Used   Substance and Sexual Activity    Alcohol use: Yes     Comment: OCCASSIONALLY    Drug use: Never    Sexual activity: Defer     No Known Allergies  Current Outpatient Medications   Medication Sig Dispense Refill    acetaminophen (TYLENOL) 650 MG 8 hr tablet       cephalexin (KEFLEX) 500 MG capsule Take 1 capsule by mouth 3 (Three) Times a Day for 7 days. 21 capsule 0    dorzolamide (TRUSOPT) 2 % ophthalmic solution INSTILL 1 DROPS INTO EACH EYE THREE TIMES DAILY      flecainide (TAMBOCOR) 100 MG tablet Take 1 tablet by mouth 2 (Two) Times a Day.      fluticasone (FLONASE) 50 MCG/ACT nasal spray 2 sprays into the nostril(s) as directed by provider Daily. 16 g 0    ibuprofen (ADVIL,MOTRIN) 800 MG tablet TAKE 1 TABLET BY MOUTH TWICE DAILY AS NEEDED WITH FOOD FOR 30 DAYS      sildenafil (VIAGRA) 100 MG tablet Take 1 tablet by mouth Daily.      Sodium Fluoride 0.2 % solution RINSE WITH 1/2 CAPFUL AT BEDTIME AFTER BRUSHING. DO NOT RINSE.      sulfamethoxazole-trimethoprim (BACTRIM DS,SEPTRA DS) 800-160 MG per tablet Take 1 tablet by mouth 2 (Two) Times a Day for 7 days. 14 tablet 0    tiZANidine (ZANAFLEX) 2 MG tablet TAKE 1 TABLET BY MOUTH TWICE DAILY AS NEEDED FOR 90 DAYS       No current facility-administered medications for this visit.         OBJECTIVE     Vitals:    07/16/25 1404   BP: 118/75   Pulse: 67   Temp: 98 °F (36.7 °C)   SpO2: 94%       WBC   Date Value Ref Range Status   07/13/2025 6.66 3.40 - 10.80 10*3/mm3 Final     RBC   Date Value Ref Range Status   07/13/2025 4.15 4.14 - 5.80 10*6/mm3 Final     Hemoglobin   Date Value Ref Range Status   07/13/2025 10.6 (L) 13.0 - 17.7 g/dL Final     Hematocrit   Date Value Ref Range Status   07/13/2025  35.1 (L) 37.5 - 51.0 % Final     MCV   Date Value Ref Range Status   07/13/2025 84.6 79.0 - 97.0 fL Final     MCH   Date Value Ref Range Status   07/13/2025 25.5 (L) 26.6 - 33.0 pg Final     MCHC   Date Value Ref Range Status   07/13/2025 30.2 (L) 31.5 - 35.7 g/dL Final     RDW   Date Value Ref Range Status   07/13/2025 17.7 (H) 12.3 - 15.4 % Final     RDW-SD   Date Value Ref Range Status   07/13/2025 54.2 (H) 37.0 - 54.0 fl Final     MPV   Date Value Ref Range Status   07/13/2025 10.5 6.0 - 12.0 fL Final     Platelets   Date Value Ref Range Status   07/13/2025 234 140 - 450 10*3/mm3 Final     Neutrophil %   Date Value Ref Range Status   07/13/2025 60.6 42.7 - 76.0 % Final     Lymphocyte %   Date Value Ref Range Status   07/13/2025 26.0 19.6 - 45.3 % Final     Monocyte %   Date Value Ref Range Status   07/13/2025 8.3 5.0 - 12.0 % Final     Eosinophil %   Date Value Ref Range Status   07/13/2025 4.2 0.3 - 6.2 % Final     Basophil %   Date Value Ref Range Status   07/13/2025 0.6 0.0 - 1.5 % Final     Immature Grans %   Date Value Ref Range Status   07/13/2025 0.3 0.0 - 0.5 % Final     Neutrophils, Absolute   Date Value Ref Range Status   07/13/2025 4.04 1.70 - 7.00 10*3/mm3 Final     Lymphocytes, Absolute   Date Value Ref Range Status   07/13/2025 1.73 0.70 - 3.10 10*3/mm3 Final     Monocytes, Absolute   Date Value Ref Range Status   07/13/2025 0.55 0.10 - 0.90 10*3/mm3 Final     Eosinophils, Absolute   Date Value Ref Range Status   07/13/2025 0.28 0.00 - 0.40 10*3/mm3 Final     Basophils, Absolute   Date Value Ref Range Status   07/13/2025 0.04 0.00 - 0.20 10*3/mm3 Final     Immature Grans, Absolute   Date Value Ref Range Status   07/13/2025 0.02 0.00 - 0.05 10*3/mm3 Final     nRBC   Date Value Ref Range Status   07/13/2025 0.0 0.0 - 0.2 /100 WBC Final         Lab Results   Component Value Date    GLUCOSE 106 (H) 07/13/2025    BUN 13.4 07/13/2025    CREATININE 0.79 07/13/2025    EGFRIFAFRI 93 10/03/2017    BCR 17.0  07/13/2025    K 4.3 07/13/2025    CO2 22.5 07/13/2025    CALCIUM 8.9 07/13/2025    ALBUMIN 4.3 07/13/2025    AST 18 07/13/2025    ALT 19 07/13/2025       Patient seen in no apparent distress.      PHYSICAL EXAM:     Foot/Ankle Exam    GENERAL  Appearance:  appears stated age  Orientation:  AAOx3  Affect:  appropriate  Gait:  unimpaired  Assistance:  independent  Right shoe gear: casual shoe  Left shoe gear: casual shoe    VASCULAR     Right Foot Vascularity   Normal vascular exam    Dorsalis pedis:  2+  Posterior tibial:  2+  Skin temperature:  warm  Edema grading:  None  CFT:  < 3 seconds  Pedal hair growth:  Present  Varicosities:  none     Left Foot Vascularity   Normal vascular exam    Dorsalis pedis:  2+  Posterior tibial:  2+  Skin temperature:  warm  Edema grading:  None  CFT:  < 3 seconds  Pedal hair growth:  Present  Varicosities:  none     NEUROLOGIC     Right Foot Neurologic   Normal sensation    Light touch sensation: normal  Vibratory sensation: normal  Hot/Cold sensation: normal  Protective Sensation using Fordyce-Chanel Monofilament:   Sites intact: 10  Sites tested: 10     Left Foot Neurologic   Normal sensation    Light touch sensation: normal  Vibratory sensation: normal  Hot/Cold sensation:  normal  Protective Sensation using Fordyce-Chanel Monofilament:   Sites intact: 10  Sites tested: 10    MUSCLE STRENGTH     Right Foot Muscle Strength   Foot dorsiflexion:  4  Foot plantar flexion:  4  Foot inversion:  4  Foot eversion:  4     Left Foot Muscle Strength   Foot dorsiflexion:  4  Foot plantar flexion:  4  Foot inversion:  4  Foot eversion:  4    RANGE OF MOTION     Right Foot Range of Motion   Foot and ankle ROM within normal limits       Left Foot Range of Motion   Foot and ankle ROM within normal limits      DERMATOLOGIC      Right Foot Dermatologic   Skin  Right foot skin is intact.      Left Foot Dermatologic   Skin  Left foot skin is intact.      Right foot additional comments:  Tenderness to distal aspect of right second toe without any ulceration or open wounds.      RADIOLOGY:        XR Toe 2+ View Right  Result Date: 7/13/2025  Narrative: XR TOE 2+ VW RIGHT Date of Exam: 7/13/2025 3:23 PM EDT Indication: Second toe?distal tip pain for the last month Comparison: None available. Findings: Soft tissue edema is noted throughout the second digit. There is evidence for erosion at the distal tip of the distal phalanx. No finding suggest changes of soft tissue cellulitis with underlying osteomyelitis of the distal phalanx. There is no gas production in the soft tissues.     Impression: Impression: Evidence for osteomyelitis involving the distal phalanx of the second digit. There is associated soft tissue edema suggesting cellulitis. There is no gas production in the soft tissues. Electronically Signed: Bill Duffy MD  7/13/2025 3:38 PM EDT  Workstation ID: [id number]      ASSESSMENT/PLAN     Diagnoses and all orders for this visit:    1. 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 Bone tumor  -     MRI Foot Right Without Contrast; Future        Comprehensive lower extremity examination and evaluation was performed.    Assessment & Plan  Patient with osteonecrosis vs bone tumor.  Will get MRI for further evaluation.  Discussed surgical options with patient.  Patient will follow-up after MRI.            Discussed findings and treatment plan including risks, benefits, and treatment options with patient in detail. Patient agreed with treatment plan.    Medications and allergies reviewed.  Reviewed available lab values along with other pertinent labs.  These were discussed with the patient.    All questions were answered to patient/family satisfaction. Should symptoms fail to improve or worsen they agree to call or return to clinic or to go to the Emergency Department. Discussed the importance of following up with any  needed screening tests/labs/specialist appointments and any requested follow-up recommended by me today. Importance of maintaining follow-up discussed and patient accepts that missed appointments can delay diagnosis and potentially lead to worsening of conditions.    Return in about 1 month (around 8/16/2025)., or sooner if acute issues arise.    Patient or patient representative verbalized consent for the use of Ambient Listening during the visit with  Jasbir Jones DPM for chart documentation. 7/17/2025  07:35 EDT    This document has been electronically signed by Jasbir Jones DPM on July 17, 2025 07:35 EDT           10

## 2025-08-29 ENCOUNTER — HOSPITAL ENCOUNTER (OUTPATIENT)
Dept: MRI IMAGING | Facility: HOSPITAL | Age: 60
Discharge: HOME | End: 2025-08-29
Admitting: PODIATRIST
Payer: COMMERCIAL

## 2025-08-29 DIAGNOSIS — D49.2 BONE TUMOR: ICD-10-CM

## 2025-08-29 DIAGNOSIS — M87.277: ICD-10-CM

## 2025-08-29 PROCEDURE — 73718 MRI LOWER EXTREMITY W/O DYE: CPT

## (undated) DEVICE — Device: Brand: DEFENDO AIR/WATER/SUCTION AND BIOPSY VALVE

## (undated) DEVICE — SOL IRRG H2O PL/BG 1000ML STRL

## (undated) DEVICE — COLON KIT: Brand: MEDLINE INDUSTRIES, INC.